# Patient Record
Sex: MALE | Race: WHITE | NOT HISPANIC OR LATINO | Employment: FULL TIME | URBAN - NONMETROPOLITAN AREA
[De-identification: names, ages, dates, MRNs, and addresses within clinical notes are randomized per-mention and may not be internally consistent; named-entity substitution may affect disease eponyms.]

---

## 2018-09-18 ENCOUNTER — TELEPHONE (OUTPATIENT)
Dept: INTERNAL MEDICINE CLINIC | Facility: CLINIC | Age: 53
End: 2018-09-18

## 2018-09-18 NOTE — TELEPHONE ENCOUNTER
Please contact patient and ask to schedule appt if he would like to stay current in our practice so we can stay up to date with his healthcare  It has been over 2 years since we have seen him in the office    Thank you

## 2019-12-23 ENCOUNTER — OFFICE VISIT (OUTPATIENT)
Dept: INTERNAL MEDICINE CLINIC | Facility: CLINIC | Age: 54
End: 2019-12-23
Payer: COMMERCIAL

## 2019-12-23 VITALS
HEIGHT: 75 IN | WEIGHT: 315 LBS | SYSTOLIC BLOOD PRESSURE: 158 MMHG | BODY MASS INDEX: 39.17 KG/M2 | TEMPERATURE: 99 F | DIASTOLIC BLOOD PRESSURE: 80 MMHG | HEART RATE: 85 BPM | OXYGEN SATURATION: 96 %

## 2019-12-23 DIAGNOSIS — Z00.00 ENCOUNTER FOR PREVENTATIVE ADULT HEALTH CARE EXAMINATION: Primary | ICD-10-CM

## 2019-12-23 DIAGNOSIS — Z12.11 COLON CANCER SCREENING: ICD-10-CM

## 2019-12-23 PROCEDURE — 99386 PREV VISIT NEW AGE 40-64: CPT | Performed by: INTERNAL MEDICINE

## 2019-12-23 NOTE — PROGRESS NOTES
BMI Counseling: Body mass index is 43 12 kg/m²  The BMI is above normal  Nutrition recommendations include moderation in carbohydrate intake  Exercise recommendations include exercising 3-5 times per week  No pharmacotherapy was ordered  Assessment/Plan:         Diagnoses and all orders for this visit:    Encounter for preventative adult health care examination  Pt will monitor BP and discussed plans to continue to lose weight  He would prefer not tot take meds for BP but if does not have better control by three month appt or sooner, he will take recommended rx  No added salt, low sodium diet discussed and resumption of exercise - he had gone to the gym in past and felt better   Increase water intake, healthy portions/snacks  He will bring copy of Wellness check that he has scheduled thru work for Thursday    Colon cancer screening  -     Occult Blood, Fecal Immunochemical; Future  Fit test provided and patient agreeable to complete  He will do labs/prostate check next visit (he is not sure if they check thru work program or not so wants to have that first)    Rto 3 months     Patient ID: Belen Baker is a 53 y o  male  HPI   Pt reestablishing care at his wife's encouragement He has lost about 15 pounds in recent weeks and does want to lose more weight No chest pain, sob, headache or vision change He has been told in past his BP was elevated He smith snot monitor He has not been exercising although did in the past and felt better when he did No limiting joint pain No recent illnesses Vision stable and no headaches or dizziness, no facial flushing He does not eat regularly due to is schedule and eats dinner later in the evening daily No change in bowels or bladder function He enjoys working but is close to time when he could retire if he chose    He is scheduled for well check at work Thursday and will have labs he said No heartburn He tends to eat mainly one meal later in the evening and not much during the day He does not add salt to food but does eat fast food at times due to work  He does not get flu shot        Review of Systems   Constitutional: Negative  HENT: Negative  Eyes: Negative  Respiratory: Negative  Cardiovascular: Negative  Gastrointestinal: Negative  Genitourinary: Negative  Musculoskeletal: Negative  Skin: Negative  Neurological: Negative  Hematological: Negative  Psychiatric/Behavioral: Negative  History reviewed  No pertinent past medical history  History reviewed  No pertinent surgical history    Social History     Socioeconomic History    Marital status: /Civil Union     Spouse name: Not on file    Number of children: Not on file    Years of education: Not on file    Highest education level: Not on file   Occupational History    Not on file   Social Needs    Financial resource strain: Not on file    Food insecurity:     Worry: Not on file     Inability: Not on file    Transportation needs:     Medical: Not on file     Non-medical: Not on file   Tobacco Use    Smoking status: Never Smoker    Smokeless tobacco: Never Used   Substance and Sexual Activity    Alcohol use: Not Currently    Drug use: Never    Sexual activity: Not on file   Lifestyle    Physical activity:     Days per week: Not on file     Minutes per session: Not on file    Stress: Not on file   Relationships    Social connections:     Talks on phone: Not on file     Gets together: Not on file     Attends [employer] service: Not on file     Active member of club or organization: Not on file     Attends meetings of clubs or organizations: Not on file     Relationship status: Not on file    Intimate partner violence:     Fear of current or ex partner: Not on file     Emotionally abused: Not on file     Physically abused: Not on file     Forced sexual activity: Not on file   Other Topics Concern    Not on file   Social History Narrative    Not on file     Allergies Allergen Reactions    Penicillins Anaphylaxis           /80   Pulse 85   Temp 99 °F (37 2 °C) (Tympanic)   Ht 6' 3" (1 905 m)   Wt (!) 156 kg (345 lb)   SpO2 96%   BMI 43 12 kg/m²          Physical Exam   Constitutional: He is oriented to person, place, and time  He appears well-developed and well-nourished  No distress  HENT:   Head: Normocephalic and atraumatic  Mouth/Throat: Oropharynx is clear and moist  No oropharyngeal exudate  Eyes: Pupils are equal, round, and reactive to light  Conjunctivae and EOM are normal  No scleral icterus  Neck: Normal range of motion  Neck supple  No JVD present  Pulmonary/Chest: Effort normal and breath sounds normal  No stridor  No respiratory distress  He has no wheezes  Abdominal: Soft  Bowel sounds are normal  He exhibits no distension  There is no tenderness  Musculoskeletal: Normal range of motion  He exhibits no edema  Lymphadenopathy:     He has no cervical adenopathy  Neurological: He is alert and oriented to person, place, and time  Skin: Skin is warm and dry  Capillary refill takes less than 2 seconds  He is not diaphoretic  Psychiatric: He has a normal mood and affect  His behavior is normal  Judgment and thought content normal    Nursing note and vitals reviewed

## 2021-05-11 ENCOUNTER — TELEPHONE (OUTPATIENT)
Dept: INTERNAL MEDICINE CLINIC | Facility: CLINIC | Age: 56
End: 2021-05-11

## 2021-05-11 DIAGNOSIS — Z00.00 ENCOUNTER FOR PREVENTATIVE ADULT HEALTH CARE EXAMINATION: Primary | ICD-10-CM

## 2021-05-17 ENCOUNTER — APPOINTMENT (OUTPATIENT)
Dept: LAB | Facility: MEDICAL CENTER | Age: 56
End: 2021-05-17
Payer: COMMERCIAL

## 2021-05-17 DIAGNOSIS — Z00.00 ENCOUNTER FOR PREVENTATIVE ADULT HEALTH CARE EXAMINATION: ICD-10-CM

## 2021-05-17 DIAGNOSIS — Z12.11 COLON CANCER SCREENING: ICD-10-CM

## 2021-05-17 LAB
25(OH)D3 SERPL-MCNC: 11.4 NG/ML (ref 30–100)
ALBUMIN SERPL BCP-MCNC: 4.1 G/DL (ref 3.5–5)
ALP SERPL-CCNC: 61 U/L (ref 46–116)
ALT SERPL W P-5'-P-CCNC: 35 U/L (ref 12–78)
ANION GAP SERPL CALCULATED.3IONS-SCNC: 4 MMOL/L (ref 4–13)
AST SERPL W P-5'-P-CCNC: 25 U/L (ref 5–45)
BASOPHILS # BLD AUTO: 0.06 THOUSANDS/ΜL (ref 0–0.1)
BASOPHILS NFR BLD AUTO: 1 % (ref 0–1)
BILIRUB SERPL-MCNC: 0.77 MG/DL (ref 0.2–1)
BUN SERPL-MCNC: 14 MG/DL (ref 5–25)
CALCIUM SERPL-MCNC: 9.2 MG/DL (ref 8.3–10.1)
CHLORIDE SERPL-SCNC: 105 MMOL/L (ref 100–108)
CHOLEST SERPL-MCNC: 169 MG/DL (ref 50–200)
CO2 SERPL-SCNC: 28 MMOL/L (ref 21–32)
CREAT SERPL-MCNC: 1.04 MG/DL (ref 0.6–1.3)
EOSINOPHIL # BLD AUTO: 0.22 THOUSAND/ΜL (ref 0–0.61)
EOSINOPHIL NFR BLD AUTO: 2 % (ref 0–6)
ERYTHROCYTE [DISTWIDTH] IN BLOOD BY AUTOMATED COUNT: 14.6 % (ref 11.6–15.1)
GFR SERPL CREATININE-BSD FRML MDRD: 80 ML/MIN/1.73SQ M
GLUCOSE P FAST SERPL-MCNC: 92 MG/DL (ref 65–99)
HCT VFR BLD AUTO: 49 % (ref 36.5–49.3)
HDLC SERPL-MCNC: 27 MG/DL
HGB BLD-MCNC: 16.3 G/DL (ref 12–17)
IMM GRANULOCYTES # BLD AUTO: 0.05 THOUSAND/UL (ref 0–0.2)
IMM GRANULOCYTES NFR BLD AUTO: 1 % (ref 0–2)
LDLC SERPL CALC-MCNC: 89 MG/DL (ref 0–100)
LYMPHOCYTES # BLD AUTO: 1.85 THOUSANDS/ΜL (ref 0.6–4.47)
LYMPHOCYTES NFR BLD AUTO: 20 % (ref 14–44)
MCH RBC QN AUTO: 28.8 PG (ref 26.8–34.3)
MCHC RBC AUTO-ENTMCNC: 33.3 G/DL (ref 31.4–37.4)
MCV RBC AUTO: 87 FL (ref 82–98)
MONOCYTES # BLD AUTO: 0.57 THOUSAND/ΜL (ref 0.17–1.22)
MONOCYTES NFR BLD AUTO: 6 % (ref 4–12)
NEUTROPHILS # BLD AUTO: 6.61 THOUSANDS/ΜL (ref 1.85–7.62)
NEUTS SEG NFR BLD AUTO: 70 % (ref 43–75)
NRBC BLD AUTO-RTO: 0 /100 WBCS
PLATELET # BLD AUTO: 179 THOUSANDS/UL (ref 149–390)
PMV BLD AUTO: 8.9 FL (ref 8.9–12.7)
POTASSIUM SERPL-SCNC: 3.7 MMOL/L (ref 3.5–5.3)
PROT SERPL-MCNC: 8 G/DL (ref 6.4–8.2)
RBC # BLD AUTO: 5.65 MILLION/UL (ref 3.88–5.62)
SODIUM SERPL-SCNC: 137 MMOL/L (ref 136–145)
T4 FREE SERPL-MCNC: 0.76 NG/DL (ref 0.76–1.46)
TRIGL SERPL-MCNC: 263 MG/DL
TSH SERPL DL<=0.05 MIU/L-ACNC: 3.79 UIU/ML (ref 0.36–3.74)
WBC # BLD AUTO: 9.36 THOUSAND/UL (ref 4.31–10.16)

## 2021-05-17 PROCEDURE — 80053 COMPREHEN METABOLIC PANEL: CPT

## 2021-05-17 PROCEDURE — 84153 ASSAY OF PSA TOTAL: CPT

## 2021-05-17 PROCEDURE — 82306 VITAMIN D 25 HYDROXY: CPT

## 2021-05-17 PROCEDURE — 84439 ASSAY OF FREE THYROXINE: CPT

## 2021-05-17 PROCEDURE — 36415 COLL VENOUS BLD VENIPUNCTURE: CPT

## 2021-05-17 PROCEDURE — 84443 ASSAY THYROID STIM HORMONE: CPT

## 2021-05-17 PROCEDURE — 80061 LIPID PANEL: CPT

## 2021-05-17 PROCEDURE — G0328 FECAL BLOOD SCRN IMMUNOASSAY: HCPCS

## 2021-05-17 PROCEDURE — 84154 ASSAY OF PSA FREE: CPT

## 2021-05-17 PROCEDURE — 85025 COMPLETE CBC W/AUTO DIFF WBC: CPT

## 2021-05-19 LAB
PSA FREE MFR SERPL: 14.2 %
PSA FREE SERPL-MCNC: 0.27 NG/ML
PSA SERPL-MCNC: 1.9 NG/ML (ref 0–4)

## 2021-08-21 ENCOUNTER — OFFICE VISIT (OUTPATIENT)
Dept: FAMILY MEDICINE CLINIC | Facility: CLINIC | Age: 56
End: 2021-08-21
Payer: COMMERCIAL

## 2021-08-21 VITALS — BODY MASS INDEX: 39.17 KG/M2 | TEMPERATURE: 97.9 F | WEIGHT: 315 LBS | HEIGHT: 75 IN

## 2021-08-21 DIAGNOSIS — Z00.00 ANNUAL PHYSICAL EXAM: Primary | ICD-10-CM

## 2021-08-21 DIAGNOSIS — Z12.11 COLON CANCER SCREENING: ICD-10-CM

## 2021-08-21 PROCEDURE — 3008F BODY MASS INDEX DOCD: CPT | Performed by: INTERNAL MEDICINE

## 2021-08-21 PROCEDURE — 3725F SCREEN DEPRESSION PERFORMED: CPT | Performed by: INTERNAL MEDICINE

## 2021-08-21 PROCEDURE — 1036F TOBACCO NON-USER: CPT | Performed by: INTERNAL MEDICINE

## 2021-08-21 PROCEDURE — 99396 PREV VISIT EST AGE 40-64: CPT | Performed by: INTERNAL MEDICINE

## 2021-08-21 NOTE — PROGRESS NOTES
[address] [place] [address] PRIMARY CARE    NAME: Albertina Key  AGE: 61 y o  SEX: male  : 1965     DATE: 2021     Assessment and Plan:     Problem List Items Addressed This Visit        Other    Annual physical exam - Primary      Other Visit Diagnoses     Colon cancer screening        Relevant Orders    Occult Blood, Fecal Immunochemical      Reviewed recent labs  Continue weight loss efforts and feels continued weight loss will continue to improve sleep sxs   Increase water intake   Aware 3rd booster for covid   He will schedule eye exam/dental utd  Rto annual/prn    Immunizations and preventive care screenings were discussed with patient today  Appropriate education was printed on patient's after visit summary  Counseling:  · Exercise: the importance of regular exercise/physical activity was discussed  Recommend exercise 3-5 times per week for at least 30 minutes  BMI Counseling: Body mass index is 40 75 kg/m²  The BMI is above normal  Nutrition recommendations include moderation in carbohydrate intake and increasing intake of lean protein  Exercise recommendations include exercising 3-5 times per week  No pharmacotherapy was ordered  Return in about 1 year (around 2022), or if symptoms worsen or fail to improve, for Recheck  Chief Complaint:     Chief Complaint   Patient presents with    Annual Exam      History of Present Illness:     Adult Annual Physical   Patient here for a comprehensive physical exam  The patient reports problems - Working on weight loss down over 40 pounds No chest pain or sob No acute loss   Diet and Physical Activity  · Diet/Nutrition: well balanced diet and low calorie diet  · Exercise: walking        Depression Screening  PHQ-9 Depression Screening    PHQ-9:   Frequency of the following problems over the past two weeks:      Little interest or pleasure in doing things: 0 - not at all  Feeling down, depressed, or hopeless: 0 - not at all  PHQ-2 Score: 0       General Health  · Sleep: gets 7-8 hours of sleep on average  · Hearing: normal - bilateral   · Vision: goes for regular eye exams  · Dental: regular dental visits   Health  · Symptoms include: none     Review of Systems:     Review of Systems   Constitutional: Negative  Negative for activity change, chills and fever  HENT: Negative  Eyes: Negative for visual disturbance  Respiratory: Negative for cough and shortness of breath  Cardiovascular: Negative for chest pain, palpitations and leg swelling  Gastrointestinal: Negative for abdominal distention and abdominal pain  Genitourinary: Negative for difficulty urinating, flank pain and frequency  Musculoskeletal: Negative  Neurological: Negative for dizziness, light-headedness and headaches  Psychiatric/Behavioral: Negative for sleep disturbance  The patient is not nervous/anxious  Past Medical History:     History reviewed  No pertinent past medical history  Past Surgical History:     History reviewed  No pertinent surgical history  Family History:     History reviewed  No pertinent family history     Social History:     Social History     Socioeconomic History    Marital status: Single     Spouse name: None    Number of children: None    Years of education: None    Highest education level: None   Occupational History    None   Tobacco Use    Smoking status: Never Smoker    Smokeless tobacco: Never Used   Vaping Use    Vaping Use: Never used   Substance and Sexual Activity    Alcohol use: Not Currently    Drug use: Never    Sexual activity: None   Other Topics Concern    None   Social History Narrative    None     Social Determinants of Health     Financial Resource Strain:     Difficulty of Paying Living Expenses:    Food Insecurity:     Worried About Running Out of Food in the Last Year:     Lloyd of Food in the Last Year:    Transportation Needs:  Lack of Transportation (Medical):  Lack of Transportation (Non-Medical):    Physical Activity:     Days of Exercise per Week:     Minutes of Exercise per Session:    Stress:     Feeling of Stress :    Social Connections:     Frequency of Communication with Friends and Family:     Frequency of Social Gatherings with Friends and Family:     Attends [employer] Services:     Active Member of Clubs or Organizations:     Attends Club or Organization Meetings:     Marital Status:    Intimate Partner Violence:     Fear of Current or Ex-Partner:     Emotionally Abused:     Physically Abused:     Sexually Abused:       Current Medications:     No current outpatient medications on file  No current facility-administered medications for this visit  Allergies:      Allergies   Allergen Reactions    Penicillins Anaphylaxis      Physical Exam:     Temp 97 9 °F (36 6 °C) (Temporal)   Ht 6' 3" (1 905 m)   Wt (!) 148 kg (326 lb)   BMI 40 75 kg/m²     Physical Exam     Dede Garcia DO  [facility]

## 2021-08-21 NOTE — PATIENT INSTRUCTIONS

## 2021-08-23 LAB — HEMOCCULT STL QL IA: NEGATIVE

## 2022-03-23 ENCOUNTER — OFFICE VISIT (OUTPATIENT)
Dept: FAMILY MEDICINE CLINIC | Facility: CLINIC | Age: 57
End: 2022-03-23
Payer: COMMERCIAL

## 2022-03-23 VITALS
HEART RATE: 78 BPM | TEMPERATURE: 97.7 F | WEIGHT: 315 LBS | SYSTOLIC BLOOD PRESSURE: 138 MMHG | BODY MASS INDEX: 39.17 KG/M2 | HEIGHT: 75 IN | RESPIRATION RATE: 18 BRPM | DIASTOLIC BLOOD PRESSURE: 76 MMHG

## 2022-03-23 DIAGNOSIS — S80.12XS TRAUMATIC HEMATOMA OF LEFT LOWER LEG, SEQUELA: Primary | ICD-10-CM

## 2022-03-23 PROBLEM — S80.12XA TRAUMATIC HEMATOMA OF LEFT LOWER LEG: Status: ACTIVE | Noted: 2022-03-23

## 2022-03-23 PROCEDURE — 3008F BODY MASS INDEX DOCD: CPT | Performed by: INTERNAL MEDICINE

## 2022-03-23 PROCEDURE — 1036F TOBACCO NON-USER: CPT | Performed by: INTERNAL MEDICINE

## 2022-03-23 PROCEDURE — 99213 OFFICE O/P EST LOW 20 MIN: CPT | Performed by: INTERNAL MEDICINE

## 2022-03-23 NOTE — PROGRESS NOTES
BMI Counseling: Body mass index is 42 5 kg/m²  The BMI is above normal  Nutrition recommendations include consuming healthier snacks, moderation in carbohydrate intake and increasing intake of lean protein  Exercise recommendations include exercising 3-5 times per week  Rationale for BMI follow-up plan is due to patient being overweight or obese  Assessment/Plan:      Diagnoses and all orders for this visit:    Traumatic hematoma of left lower leg, sequela  -     VAS lower limb venous duplex study, unilateral/limited; Future  Doppler to be schedule  Take FS aspirin daily Elevate at rest May need vascular followup as has significant varicosities underlying           Annual due in August     Patient ID: Ming Stevens is a 61 y o  male  HPI   Pt injured his leg 2 months ago - scraped on [employer] object  Open area closed but has discoloration and swelling lle no pain He has been working has varicose veins, nontender     Review of Systems   Constitutional: Negative for activity change, chills and fever  Respiratory: Negative for shortness of breath  Cardiovascular: Positive for leg swelling  Negative for chest pain  Musculoskeletal: Positive for arthralgias  Neurological: Negative for dizziness, light-headedness and numbness  No past medical history on file  No past surgical history on file    Social History     Socioeconomic History    Marital status: /Civil Union     Spouse name: Not on file    Number of children: Not on file    Years of education: Not on file    Highest education level: Not on file   Occupational History    Not on file   Tobacco Use    Smoking status: Never Smoker    Smokeless tobacco: Never Used   Vaping Use    Vaping Use: Never used   Substance and Sexual Activity    Alcohol use: Not Currently    Drug use: Never    Sexual activity: Not on file   Other Topics Concern    Not on file   Social History Narrative    Not on file     Social Determinants of Health Financial Resource Strain: Not on file   Food Insecurity: Not on file   Transportation Needs: Not on file   Physical Activity: Not on file   Stress: Not on file   Social Connections: Not on file   Intimate Partner Violence: Not on file   Housing Stability: Not on file     Allergies   Allergen Reactions    Penicillins Anaphylaxis     Visit Vitals  /76   Pulse 78   Temp 97 7 °F (36 5 °C) (Temporal)   Resp 18   Ht 6' 3" (1 905 m)   Wt (!) 154 kg (340 lb)   BMI 42 50 kg/m²   Smoking Status Never Smoker   BSA 2 75 m²          Physical Exam  Constitutional:       General: He is not in acute distress  Appearance: Normal appearance  He is not ill-appearing, toxic-appearing or diaphoretic  HENT:      Head: Normocephalic and atraumatic  Nose: Nose normal    Eyes:      General: No scleral icterus  Cardiovascular:      Pulses: Normal pulses  Heart sounds: Normal heart sounds  No murmur heard  Pulmonary:      Effort: Pulmonary effort is normal  No respiratory distress  Breath sounds: Normal breath sounds  No wheezing  Abdominal:      General: Bowel sounds are normal       Palpations: Abdomen is soft  Musculoskeletal:         General: Swelling and signs of injury present  No tenderness  Comments: Hematoma nt above left leg   Skin:     General: Skin is dry  Coloration: Skin is not jaundiced or pale  Findings: No erythema  Neurological:      General: No focal deficit present  Mental Status: He is alert and oriented to person, place, and time  Mental status is at baseline  Psychiatric:         Mood and Affect: Mood normal          Behavior: Behavior normal          Thought Content:  Thought content normal          Judgment: Judgment normal        slight pink color to shin area no warmth

## 2022-03-28 ENCOUNTER — HOSPITAL ENCOUNTER (OUTPATIENT)
Dept: NON INVASIVE DIAGNOSTICS | Facility: HOSPITAL | Age: 57
Discharge: HOME | End: 2022-03-28
Payer: COMMERCIAL

## 2022-03-28 DIAGNOSIS — S80.12XS TRAUMATIC HEMATOMA OF LEFT LOWER LEG, SEQUELA: ICD-10-CM

## 2022-03-28 PROCEDURE — 93971 EXTREMITY STUDY: CPT

## 2022-03-28 PROCEDURE — 93971 EXTREMITY STUDY: CPT | Performed by: SURGERY

## 2022-10-10 ENCOUNTER — OFFICE VISIT (OUTPATIENT)
Dept: FAMILY MEDICINE CLINIC | Facility: CLINIC | Age: 57
End: 2022-10-10
Payer: COMMERCIAL

## 2022-10-10 VITALS
DIASTOLIC BLOOD PRESSURE: 80 MMHG | HEART RATE: 70 BPM | RESPIRATION RATE: 18 BRPM | TEMPERATURE: 98 F | SYSTOLIC BLOOD PRESSURE: 142 MMHG | BODY MASS INDEX: 39.17 KG/M2 | HEIGHT: 75 IN | WEIGHT: 315 LBS

## 2022-10-10 DIAGNOSIS — R05.2 SUBACUTE COUGH: ICD-10-CM

## 2022-10-10 DIAGNOSIS — E66.01 MORBID OBESITY (HCC): ICD-10-CM

## 2022-10-10 DIAGNOSIS — E03.9 ACQUIRED HYPOTHYROIDISM: ICD-10-CM

## 2022-10-10 DIAGNOSIS — Z00.00 ANNUAL PHYSICAL EXAM: Primary | ICD-10-CM

## 2022-10-10 DIAGNOSIS — Z12.11 SCREENING FOR COLON CANCER: ICD-10-CM

## 2022-10-10 PROBLEM — S80.12XA TRAUMATIC HEMATOMA OF LEFT LOWER LEG: Status: RESOLVED | Noted: 2022-03-23 | Resolved: 2022-10-10

## 2022-10-10 PROCEDURE — 99396 PREV VISIT EST AGE 40-64: CPT | Performed by: INTERNAL MEDICINE

## 2022-10-10 NOTE — PROGRESS NOTES
[address]    NAME: Nathanael Bhatt  AGE: 61 y o  SEX: male  : 1965     DATE: 10/10/2022     Assessment and Plan:     Problem List Items Addressed This Visit        Other    Morbid obesity ([address] )    Relevant Orders    TSH, 3rd generation with Free T4 reflex    Annual physical exam - Primary    Relevant Orders    Lipid panel    Comprehensive metabolic panel      Other Visit Diagnoses     Acquired hypothyroidism        Relevant Orders    TSH, 3rd generation with Free T4 reflex    Subacute cough        Relevant Orders    XR chest pa & lateral    Screening for colon cancer        Relevant Orders    Cologuard      Rx for cologard  CXR and labs - pt will go Saturday   Weight loss and improved diet habits encouraged   No added salt diet Increase water intake  Exercise encouraged  Deferred flu shot   Annual/prn pending labs and cxr may need pulmonary/cardiac workup Pt states he will work on weight loss which has been able to accomplish in past which will help overall status     Immunizations and preventive care screenings were discussed with patient today  Appropriate education was printed on patient's after visit summary  Discussed risks and benefits of prostate cancer screening  We discussed the controversial history of PSA screening for prostate cancer in the [place] as well as the risk of over detection and over treatment of prostate cancer by way of PSA screening  The patient understands that PSA blood testing is an imperfect way to screen for prostate cancer and that elevated PSA levels in the blood may also be caused by infection, inflammation, prostatic trauma or manipulation, urological procedures, or by benign prostatic enlargement      The role of the digital rectal examination in prostate cancer screening was also discussed and I discussed with him that there is large interobserver variability in the findings of digital rectal examination  Counseling:  · Exercise: the importance of regular exercise/physical activity was discussed  Recommend exercise 3-5 times per week for at least 30 minutes  Depression Screening and Follow-up Plan: Patient was screened for depression during today's encounter  They screened negative with a PHQ-2 score of 0  Return in about 1 year (around 10/10/2023), or if symptoms worsen or fail to improve  Chief Complaint:     Chief Complaint   Patient presents with   • Annual Exam   • Cough     Lingering cough for weeks, negative home covid test        History of Present Illness:     Adult Annual Physical   Patient here for a comprehensive physical exam  The patient reports problems - Cough lingering since uri about 4 weeks ago COvid negative Mostly dry cough Occasional mucous Was taking robitussin but nothing daily Has nasal congestion  Diet and Physical Activity  · Diet/Nutrition: limited junk food  · Exercise: no formal exercise  Depression Screening  PHQ-2/9 Depression Screening    Little interest or pleasure in doing things: 0 - not at all  Feeling down, depressed, or hopeless: 0 - not at all  PHQ-2 Score: 0  PHQ-2 Interpretation: Negative depression screen       General Health  · Sleep: gets 4-6 hours of sleep on average  · Hearing: normal - bilateral   · Vision: no vision problems  · Dental: regular dental visits   Health  · Symptoms include: none     Review of Systems:     Review of Systems   Constitutional: Negative for activity change, chills and fever  HENT: Positive for congestion and postnasal drip  Eyes: Negative for visual disturbance  Respiratory: Positive for shortness of breath  Negative for cough  Cardiovascular: Negative for chest pain, palpitations and leg swelling  Gastrointestinal: Negative for abdominal distention and abdominal pain  Genitourinary: Negative  Neurological: Negative for dizziness     Psychiatric/Behavioral: Negative for sleep disturbance  The patient is not nervous/anxious  Past Medical History:     Past Medical History:   Diagnosis Date   • Hypertension 8/7/2012      Past Surgical History:     History reviewed  No pertinent surgical history  Family History:     History reviewed  No pertinent family history  Social History:     Social History     Socioeconomic History   • Marital status: /Civil Union     Spouse name: None   • Number of children: None   • Years of education: None   • Highest education level: None   Occupational History   • None   Tobacco Use   • Smoking status: Never Smoker   • Smokeless tobacco: Never Used   Vaping Use   • Vaping Use: Never used   Substance and Sexual Activity   • Alcohol use: Not Currently   • Drug use: Never   • Sexual activity: None   Other Topics Concern   • None   Social History Narrative   • None     Social Determinants of Health     Financial Resource Strain: Not on file   Food Insecurity: Not on file   Transportation Needs: Not on file   Physical Activity: Not on file   Stress: Not on file   Social Connections: Not on file   Intimate Partner Violence: Not on file   Housing Stability: Not on file      Current Medications:     No current outpatient medications on file  No current facility-administered medications for this visit  Allergies: Allergies   Allergen Reactions   • Penicillins Anaphylaxis      Physical Exam:     /76   Pulse 70   Temp 98 °F (36 7 °C) (Temporal)   Resp 18   Ht 6' 3" (1 905 m)   Wt (!) 166 kg (367 lb)   BMI 45 87 kg/m²     Physical Exam  Vitals reviewed  Constitutional:       General: He is not in acute distress  Appearance: Normal appearance  He is not ill-appearing, toxic-appearing or diaphoretic  HENT:      Head: Normocephalic and atraumatic  Right Ear: External ear normal       Left Ear: External ear normal       Nose: Nose normal       Mouth/Throat:      Mouth: Mucous membranes are dry        Pharynx: Oropharyngeal exudate present  Eyes:      General: No scleral icterus  Extraocular Movements: Extraocular movements intact  Conjunctiva/sclera: Conjunctivae normal       Pupils: Pupils are equal, round, and reactive to light  Cardiovascular:      Rate and Rhythm: Normal rate and regular rhythm  Pulses: Normal pulses  Heart sounds: Normal heart sounds  Pulmonary:      Effort: Pulmonary effort is normal  No respiratory distress  Breath sounds: Normal breath sounds  No wheezing  Abdominal:      General: Bowel sounds are normal  There is no distension  Palpations: Abdomen is soft  Tenderness: There is no abdominal tenderness  Musculoskeletal:      Cervical back: Normal range of motion and neck supple  No rigidity  Right lower leg: No edema  Left lower leg: No edema  Lymphadenopathy:      Cervical: No cervical adenopathy  Skin:     General: Skin is dry  Coloration: Skin is not jaundiced or pale  Neurological:      General: No focal deficit present  Mental Status: He is alert and oriented to person, place, and time  Mental status is at baseline  Psychiatric:         Mood and Affect: Mood normal          Behavior: Behavior normal          Thought Content:  Thought content normal          Judgment: Judgment normal           Hyacinth Garrett DO  [facility]

## 2022-10-10 NOTE — PATIENT INSTRUCTIONS

## 2022-10-15 ENCOUNTER — LAB (OUTPATIENT)
Dept: LAB | Facility: MEDICAL CENTER | Age: 57
End: 2022-10-15
Payer: COMMERCIAL

## 2022-10-15 ENCOUNTER — APPOINTMENT (OUTPATIENT)
Dept: RADIOLOGY | Facility: MEDICAL CENTER | Age: 57
End: 2022-10-15
Payer: COMMERCIAL

## 2022-10-15 DIAGNOSIS — R05.2 SUBACUTE COUGH: ICD-10-CM

## 2022-10-15 DIAGNOSIS — E03.9 ACQUIRED HYPOTHYROIDISM: ICD-10-CM

## 2022-10-15 DIAGNOSIS — E66.01 MORBID OBESITY (HCC): ICD-10-CM

## 2022-10-15 DIAGNOSIS — Z00.00 ANNUAL PHYSICAL EXAM: ICD-10-CM

## 2022-10-15 PROCEDURE — 80053 COMPREHEN METABOLIC PANEL: CPT

## 2022-10-15 PROCEDURE — 84443 ASSAY THYROID STIM HORMONE: CPT

## 2022-10-15 PROCEDURE — 36415 COLL VENOUS BLD VENIPUNCTURE: CPT

## 2022-10-15 PROCEDURE — 71046 X-RAY EXAM CHEST 2 VIEWS: CPT

## 2022-10-15 PROCEDURE — 80061 LIPID PANEL: CPT

## 2022-10-16 DIAGNOSIS — R73.9 HYPERGLYCEMIA: Primary | ICD-10-CM

## 2022-10-16 LAB
ALBUMIN SERPL BCP-MCNC: 3.5 G/DL (ref 3.5–5)
ALP SERPL-CCNC: 61 U/L (ref 46–116)
ALT SERPL W P-5'-P-CCNC: 41 U/L (ref 12–78)
ANION GAP SERPL CALCULATED.3IONS-SCNC: 5 MMOL/L (ref 4–13)
AST SERPL W P-5'-P-CCNC: 23 U/L (ref 5–45)
BILIRUB SERPL-MCNC: 0.58 MG/DL (ref 0.2–1)
BUN SERPL-MCNC: 19 MG/DL (ref 5–25)
CALCIUM SERPL-MCNC: 9 MG/DL (ref 8.3–10.1)
CHLORIDE SERPL-SCNC: 107 MMOL/L (ref 96–108)
CHOLEST SERPL-MCNC: 146 MG/DL
CO2 SERPL-SCNC: 26 MMOL/L (ref 21–32)
CREAT SERPL-MCNC: 1.31 MG/DL (ref 0.6–1.3)
GFR SERPL CREATININE-BSD FRML MDRD: 59 ML/MIN/1.73SQ M
GLUCOSE P FAST SERPL-MCNC: 119 MG/DL (ref 65–99)
HDLC SERPL-MCNC: 22 MG/DL
LDLC SERPL CALC-MCNC: 88 MG/DL (ref 0–100)
NONHDLC SERPL-MCNC: 124 MG/DL
POTASSIUM SERPL-SCNC: 4.2 MMOL/L (ref 3.5–5.3)
PROT SERPL-MCNC: 7.8 G/DL (ref 6.4–8.4)
SODIUM SERPL-SCNC: 138 MMOL/L (ref 135–147)
TRIGL SERPL-MCNC: 182 MG/DL
TSH SERPL DL<=0.05 MIU/L-ACNC: 3.87 UIU/ML (ref 0.45–4.5)

## 2023-10-31 ENCOUNTER — OFFICE VISIT (OUTPATIENT)
Dept: FAMILY MEDICINE CLINIC | Facility: CLINIC | Age: 58
End: 2023-10-31
Payer: COMMERCIAL

## 2023-10-31 VITALS
HEART RATE: 85 BPM | BODY MASS INDEX: 39.17 KG/M2 | TEMPERATURE: 97.8 F | RESPIRATION RATE: 18 BRPM | HEIGHT: 75 IN | OXYGEN SATURATION: 96 % | DIASTOLIC BLOOD PRESSURE: 72 MMHG | SYSTOLIC BLOOD PRESSURE: 134 MMHG | WEIGHT: 315 LBS

## 2023-10-31 DIAGNOSIS — E03.9 HYPOTHYROIDISM, UNSPECIFIED TYPE: ICD-10-CM

## 2023-10-31 DIAGNOSIS — Z00.00 ANNUAL PHYSICAL EXAM: Primary | ICD-10-CM

## 2023-10-31 DIAGNOSIS — E78.2 MIXED HYPERLIPIDEMIA: ICD-10-CM

## 2023-10-31 PROCEDURE — 99396 PREV VISIT EST AGE 40-64: CPT | Performed by: INTERNAL MEDICINE

## 2023-10-31 NOTE — PROGRESS NOTES
[facility]    NAME: Margo Adame  AGE: 60 y.o. SEX: male  : 1965     DATE: 10/31/2023     Assessment and Plan:     Problem List Items Addressed This Visit          Other    Annual physical exam - Primary     Other Visit Diagnoses       Hypothyroidism, unspecified type        Mixed hyperlipidemia            Low fat diet Exercise encouraged  Pt will get wellness labs at work soon and will bring copy  Stay hydrated   Deferred flu shot     Rto 6months     Immunizations and preventive care screenings were discussed with patient today. Appropriate education was printed on patient's after visit summary. Counseling:  Exercise: the importance of regular exercise/physical activity was discussed. Recommend exercise 3-5 times per week for at least 30 minutes. BMI Counseling: Body mass index is 45.12 kg/m². The BMI is above normal. Nutrition recommendations include consuming healthier snacks and increasing intake of lean protein. Exercise recommendations include exercising 3-5 times per week. Rationale for BMI follow-up plan is due to patient being overweight or obese. Depression Screening and Follow-up Plan: Patient was screened for depression during today's encounter. They screened negative with a PHQ-2 score of 0. Return in about 6 months (around 2024). Chief Complaint:     Chief Complaint   Patient presents with    Annual Exam      History of Present Illness:     Adult Annual Physical   Patient here for a comprehensive physical exam. The patient reports problems - Annual PE Patient doing ok Has new position and works in office now He is adapting No regular exercise and diet he feels is better than it was Recovering from a cold otherwise no acute issues  . Diet and Physical Activity  Diet/Nutrition: well balanced diet. Exercise: no formal exercise.       Depression Screening  PHQ-2/9 Depression Screening Little interest or pleasure in doing things: 0 - not at all  Feeling down, depressed, or hopeless: 0 - not at all  PHQ-2 Score: 0  PHQ-2 Interpretation: Negative depression screen       General Health  Sleep: gets 7-8 hours of sleep on average. Hearing: normal - bilateral.  Vision: no vision problems and goes for regular eye exams. Dental: regular dental visits.  Health  Symptoms include: none    Advanced Care Planning  Do you have an advanced directive? no  Do you have a durable medical power of ? no     Review of Systems:     Review of Systems   Constitutional:  Negative for chills and fever. HENT:  Positive for congestion. Clearing uri   Eyes:  Negative for visual disturbance. Respiratory:  Negative for cough and shortness of breath. Cardiovascular:  Negative for chest pain, palpitations and leg swelling. Gastrointestinal:  Negative for abdominal distention and abdominal pain. Genitourinary: Negative. Musculoskeletal:  Positive for arthralgias. Neurological:  Negative for dizziness, light-headedness and headaches. Psychiatric/Behavioral:  Negative for sleep disturbance. The patient is not nervous/anxious. Past Medical History:     Past Medical History:   Diagnosis Date    Hypertension 8/7/2012      Past Surgical History:     History reviewed. No pertinent surgical history. Family History:     History reviewed. No pertinent family history.    Social History:     Social History     Socioeconomic History    Marital status: /Civil Union     Spouse name: None    Number of children: None    Years of education: None    Highest education level: None   Occupational History    None   Tobacco Use    Smoking status: Never    Smokeless tobacco: Never   Vaping Use    Vaping Use: Never used   Substance and Sexual Activity    Alcohol use: Not Currently    Drug use: Never    Sexual activity: None   Other Topics Concern    None   Social History Narrative    None Social Determinants of Health     Financial Resource Strain: Not on file   Food Insecurity: Not on file   Transportation Needs: Not on file   Physical Activity: Not on file   Stress: Not on file   Social Connections: Not on file   Intimate Partner Violence: Not on file   Housing Stability: Not on file      Current Medications:     No current outpatient medications on file. No current facility-administered medications for this visit. Allergies: Allergies   Allergen Reactions    Penicillins Anaphylaxis      Physical Exam:     /72   Pulse 85   Temp 97.8 °F (36.6 °C) (Temporal)   Resp 18   Ht 6' 3" (1.905 m)   Wt (!) 164 kg (361 lb)   SpO2 96%   BMI 45.12 kg/m²     Physical Exam  Vitals reviewed. Constitutional:       General: He is not in acute distress. Appearance: Normal appearance. He is not ill-appearing, toxic-appearing or diaphoretic. HENT:      Head: Normocephalic and atraumatic. Right Ear: External ear normal.      Left Ear: External ear normal.      Nose: Congestion present. Mouth/Throat:      Mouth: Mucous membranes are moist.   Eyes:      Extraocular Movements: Extraocular movements intact. Conjunctiva/sclera: Conjunctivae normal.      Pupils: Pupils are equal, round, and reactive to light. Cardiovascular:      Rate and Rhythm: Normal rate and regular rhythm. Pulses: Normal pulses. Pulmonary:      Effort: Pulmonary effort is normal. No respiratory distress. Breath sounds: Normal breath sounds. No wheezing. Abdominal:      General: Bowel sounds are normal. There is no distension. Palpations: Abdomen is soft. Tenderness: There is no abdominal tenderness. Musculoskeletal:      Cervical back: Normal range of motion and neck supple. Right lower leg: No edema. Left lower leg: No edema. Lymphadenopathy:      Cervical: No cervical adenopathy. Skin:     General: Skin is warm and dry.    Neurological:      General: No focal deficit present. Mental Status: He is alert and oriented to person, place, and time. Mental status is at baseline. Cranial Nerves: No cranial nerve deficit. Psychiatric:         Mood and Affect: Mood normal.         Behavior: Behavior normal.         Thought Content:  Thought content normal.         Judgment: Judgment normal.          DO Lc Shetty

## 2023-10-31 NOTE — PATIENT INSTRUCTIONS
Wellness Visit for Adults   AMBULATORY CARE:   A wellness visit  is when you see your healthcare provider to get screened for health problems. Your healthcare provider will also give you advice on how to stay healthy. Write down your questions so you remember to ask them. Ask your healthcare provider how often you should have a wellness visit. What happens at a wellness visit:  Your healthcare provider will ask about your health, and your family history of health problems. This includes high blood pressure, heart disease, and cancer. He or she will ask if you have symptoms that concern you, if you smoke, and about your mood. You may also be asked about your intake of medicines, supplements, food, and alcohol. Any of the following may be done: Your weight  will be checked. Your height may also be checked so your body mass index (BMI) can be calculated. Your BMI shows if you are at a healthy weight. Your blood pressure  and heart rate will be checked. Your temperature may also be checked. Blood and urine tests  may be done. Blood tests may be done to check your cholesterol levels. Abnormal cholesterol levels increase your risk for heart disease and stroke. You may also need a blood or urine test to check for diabetes if you are at increased risk. Urine tests may be done to look for signs of an infection or kidney disease. A physical exam  includes checking your heartbeat and lungs with a stethoscope. Your healthcare provider may also check your skin to look for sun damage. Screening tests  may be recommended. A screening test is done to check for diseases that may not cause symptoms. The screening tests you may need depend on your age, gender, family history, and lifestyle habits. For example, colorectal screening may be recommended if you are 48years old or older. Screening tests you need if you are a woman:   A Pap smear  is used to screen for cervical cancer.  Pap smears are usually done every 3 to 5 years depending on your age. You may need them more often if you have had abnormal Pap smear test results in the past. Ask your healthcare provider how often you should have a Pap smear. A mammogram  is an x-ray of your breasts to screen for breast cancer. Experts recommend mammograms every 2 years starting at age 48 years. You may need a mammogram at age 52 years or younger if you have an increased risk for breast cancer. Talk to your healthcare provider about when you should start having mammograms and how often you need them. Vaccines you may need:   Get an influenza vaccine  every year. The influenza vaccine protects you from the flu. Several types of viruses cause the flu. The viruses change over time, so new vaccines are made each year. Get a tetanus-diphtheria (Td) booster vaccine  every 10 years. This vaccine protects you against tetanus and diphtheria. Tetanus is a severe infection that may cause painful muscle spasms and lockjaw. Diphtheria is a severe bacterial infection that causes a thick covering in the back of your mouth and throat. Get a human papillomavirus (HPV) vaccine  if you are female and aged 23 to 32 or male 23 to 24 and never received it. This vaccine protects you from HPV infection. HPV is the most common infection spread by sexual contact. HPV may also cause vaginal, penile, and anal cancers. Get a pneumococcal vaccine  if you are aged 72 years or older. The pneumococcal vaccine is an injection given to protect you from pneumococcal disease. Pneumococcal disease is an infection caused by pneumococcal bacteria. The infection may cause pneumonia, meningitis, or an ear infection. Get a shingles vaccine  if you are 60 or older, even if you have had shingles before. The shingles vaccine is an injection to protect you from the varicella-zoster virus. This is the same virus that causes chickenpox.  Shingles is a painful rash that develops in people who had chickenpox or have been exposed to the virus. How to eat healthy:  My Plate is a model for planning healthy meals. It shows the types and amounts of foods that should go on your plate. Fruits and vegetables make up about half of your plate, and grains and protein make up the other half. A serving of dairy is included on the side of your plate. The amount of calories and serving sizes you need depends on your age, gender, weight, and height. Examples of healthy foods are listed below:  Eat a variety of vegetables  such as dark green, red, and orange vegetables. You can also include canned vegetables low in sodium (salt) and frozen vegetables without added butter or sauces. Eat a variety of fresh fruits , canned fruit in 100% juice, frozen fruit, and dried fruit. Include whole grains. At least half of the grains you eat should be whole grains. Examples include whole-wheat bread, wheat pasta, brown rice, and whole-grain cereals such as oatmeal.    Eat a variety of protein foods such as seafood (fish and shellfish), lean meat, and poultry without skin (turkey and chicken). Examples of lean meats include pork leg, shoulder, or tenderloin, and beef round, sirloin, tenderloin, and extra lean ground beef. Other protein foods include eggs and egg substitutes, beans, peas, soy products, nuts, and seeds. Choose low-fat dairy products such as skim or 1% milk or low-fat yogurt, cheese, and cottage cheese. Limit unhealthy fats  such as butter, hard margarine, and shortening. Exercise:  Exercise at least 30 minutes per day on most days of the week. Some examples of exercise include walking, biking, dancing, and swimming. You can also fit in more physical activity by taking the stairs instead of the elevator or parking farther away from stores. Include muscle strengthening activities 2 days each week. Regular exercise provides many health benefits.  It helps you manage your weight, and decreases your risk for type 2 diabetes, heart disease, stroke, and high blood pressure. Exercise can also help improve your mood. Ask your healthcare provider about the best exercise plan for you. General health and safety guidelines:   Do not smoke. Nicotine and other chemicals in cigarettes and cigars can cause lung damage. Ask your healthcare provider for information if you currently smoke and need help to quit. E-cigarettes or smokeless tobacco still contain nicotine. Talk to your healthcare provider before you use these products. Limit alcohol. A drink of alcohol is 12 ounces of beer, 5 ounces of wine, or 1½ ounces of liquor. Lose weight, if needed. Being overweight increases your risk of certain health conditions. These include heart disease, high blood pressure, type 2 diabetes, and certain types of cancer. Protect your skin. Do not sunbathe or use tanning beds. Use sunscreen with a SPF 15 or higher. Apply sunscreen at least 15 minutes before you go outside. Reapply sunscreen every 2 hours. Wear protective clothing, hats, and sunglasses when you are outside. Drive safely. Always wear your seatbelt. Make sure everyone in your car wears a seatbelt. A seatbelt can save your life if you are in an accident. Do not use your cell phone when you are driving. This could distract you and cause an accident. Pull over if you need to make a call or send a text message. Practice safe sex. Use latex condoms if are sexually active and have more than one partner. Your healthcare provider may recommend screening tests for sexually transmitted infections (STIs). Wear helmets, lifejackets, and protective gear. Always wear a helmet when you ride a bike or motorcycle, go skiing, or play sports that could cause a head injury. Wear protective equipment when you play sports. Wear a lifejacket when you are on a boat or doing water sports.     © Copyright Sabrina Subramanian 2023 Information is for End User's use only and may not be sold, redistributed or otherwise used for commercial purposes. The above information is an  only. It is not intended as medical advice for individual conditions or treatments. Talk to your doctor, nurse or pharmacist before following any medical regimen to see if it is safe and effective for you.

## 2023-11-18 DIAGNOSIS — Z12.11 SCREEN FOR COLON CANCER: Primary | ICD-10-CM

## 2023-12-08 ENCOUNTER — TELEPHONE (OUTPATIENT)
Age: 58
End: 2023-12-08

## 2023-12-08 NOTE — TELEPHONE ENCOUNTER
12/08/23  Screened by: Temi Melo    Referring Provider     Pre- Screening: There is no height or weight on file to calculate BMI. Has patient been referred for a routine screening Colonoscopy? yes  Is the patient between 43-73 years old? yes      Previous Colonoscopy no   If yes:    Date:     Facility:     Reason:       SCHEDULING STAFF: If the patient is between 45yrs-49yrs, please advise patient to confirm benefits/coverage with their insurance company for a routine screening colonoscopy, some insurance carriers will only cover at [address] [place] [address] or older. If the patient is over 66years old, please schedule an office visit. Does the patient want to see a Gastroenterologist prior to their procedure OR are they having any GI symptoms? no    Has the patient been hospitalized or had abdominal surgery in the past 6 months? no    Does the patient use supplemental oxygen? no    Does the patient take Coumadin, Lovenox, Plavix, Elliquis, Xarelto, or other blood thinning medication? no    Has the patient had a stroke, cardiac event, or stent placed in the past year? no    SCHEDULING STAFF: If patient answers NO to above questions, then schedule procedure. If patient answers YES to above questions, then schedule office appointment. PASSED OA    If patient is between 45yrs - 49yrs, please advise patient that we will have to confirm benefits & coverage with their insurance company for a routine screening colonoscopy.

## 2023-12-08 NOTE — TELEPHONE ENCOUNTER
Scheduled date of colonoscopy (as of today): 2/28/24  Physician performing colonoscopy: naldo  Location of colonoscopy: [facility]  Bowel prep reviewed with patient: nick / bib  Clearances: n/a

## 2023-12-22 LAB — HBA1C MFR BLD HPLC: 6.4 %

## 2024-05-01 ENCOUNTER — OFFICE VISIT (OUTPATIENT)
Dept: FAMILY MEDICINE CLINIC | Facility: CLINIC | Age: 59
End: 2024-05-01
Payer: COMMERCIAL

## 2024-05-01 VITALS
DIASTOLIC BLOOD PRESSURE: 74 MMHG | HEIGHT: 75 IN | OXYGEN SATURATION: 97 % | BODY MASS INDEX: 39.17 KG/M2 | RESPIRATION RATE: 18 BRPM | WEIGHT: 315 LBS | HEART RATE: 88 BPM | SYSTOLIC BLOOD PRESSURE: 138 MMHG | TEMPERATURE: 97.4 F

## 2024-05-01 DIAGNOSIS — E66.01 MORBID OBESITY (HCC): ICD-10-CM

## 2024-05-01 DIAGNOSIS — R73.03 PREDIABETES: Primary | ICD-10-CM

## 2024-05-01 DIAGNOSIS — Z12.11 COLON CANCER SCREENING: ICD-10-CM

## 2024-05-01 PROCEDURE — 99214 OFFICE O/P EST MOD 30 MIN: CPT | Performed by: INTERNAL MEDICINE

## 2024-05-01 PROCEDURE — 3725F SCREEN DEPRESSION PERFORMED: CPT | Performed by: INTERNAL MEDICINE

## 2024-05-01 NOTE — PROGRESS NOTES
"Name: Tyson Tovar      : 1965      MRN: [id number]  Encounter Provider: Estrellita Bryant DO  Encounter Date: 2024   Encounter department: [facility]    Assessment & Plan     1. Prediabetes  A1c 6.4 and discussed lo carb diet and increased water intake He has stopped soda and plans to work on diet     2. Morbid obesity (HCC)  Pt aware and will work on weight loss with diet changes and walking    3. Colon cancer screening  He has to r/s colon that needed to be cx due to work commitment       Depression Screening and Follow-up Plan: Patient was screened for depression during today's encounter. They screened negative with a PHQ-2 score of 0.      Rto 6months   Subjective      HPI  Pt ok but has some stress at home He is aware weight has not changed much but he did stop soda recently and plans to work on diet to lose weight He may be making some life changes soon which could help NO chest pain or sob He had colonoscopy scheduled but had to cancel due to work but will reschedule   Review of Systems   Constitutional:  Negative for chills and fever.   HENT: Negative.     Eyes:  Negative for visual disturbance.   Respiratory:  Negative for cough and shortness of breath.    Cardiovascular:  Negative for chest pain, palpitations and leg swelling.   Gastrointestinal:  Negative for abdominal distention and abdominal pain.   Genitourinary: Negative.    Musculoskeletal: Negative.    Neurological:  Negative for dizziness, light-headedness and headaches.   Psychiatric/Behavioral:  The patient is not nervous/anxious.        No current outpatient medications on file prior to visit.       Objective     /74   Pulse 88   Temp (!) 97.4 °F (36.3 °C) (Temporal)   Resp 18   Ht 6' 3\" (1.905 m)   Wt (!) 168 kg (371 lb)   SpO2 97%   BMI 46.37 kg/m²     Physical Exam  Vitals and nursing note reviewed.   Constitutional:       General: He is not in acute distress.     Appearance: Normal appearance. He is " not ill-appearing, toxic-appearing or diaphoretic.   HENT:      Head: Normocephalic and atraumatic.      Right Ear: External ear normal.      Left Ear: External ear normal.      Nose: Nose normal.      Mouth/Throat:      Mouth: Mucous membranes are moist.   Cardiovascular:      Rate and Rhythm: Normal rate and regular rhythm.      Pulses: Normal pulses.   Pulmonary:      Effort: Pulmonary effort is normal. No respiratory distress.      Breath sounds: Normal breath sounds. No wheezing.   Abdominal:      General: Bowel sounds are normal.      Palpations: Abdomen is soft.   Musculoskeletal:      Cervical back: Normal range of motion and neck supple.      Right lower leg: No edema.      Left lower leg: No edema.   Lymphadenopathy:      Cervical: No cervical adenopathy.   Skin:     General: Skin is warm and dry.      Coloration: Skin is not jaundiced or pale.   Neurological:      General: No focal deficit present.      Mental Status: He is alert and oriented to person, place, and time. Mental status is at baseline.      Cranial Nerves: No cranial nerve deficit.   Psychiatric:         Mood and Affect: Mood normal.         Behavior: Behavior normal.         Thought Content: Thought content normal.         Judgment: Judgment normal.       Estrellita Bryant,

## 2024-05-31 PROBLEM — Z12.11 COLON CANCER SCREENING: Status: RESOLVED | Noted: 2019-12-23 | Resolved: 2024-05-31

## 2024-06-10 ENCOUNTER — TELEPHONE (OUTPATIENT)
Dept: FAMILY MEDICINE CLINIC | Facility: CLINIC | Age: 59
End: 2024-06-10

## 2024-06-10 DIAGNOSIS — Z91.89 AT RISK FOR CARDIAC COMPLICATION: ICD-10-CM

## 2024-06-10 DIAGNOSIS — R73.03 PREDIABETES: Primary | ICD-10-CM

## 2024-06-10 NOTE — TELEPHONE ENCOUNTER
Patient requesting to get cardiac work up, due to extreme sob and family history (father) having cardiac issues.  He knows he gained a large amount of weight and needs to start walking, but unable to walk down the street without sob

## 2024-07-11 ENCOUNTER — CONSULT (OUTPATIENT)
Dept: CARDIOLOGY CLINIC | Facility: CLINIC | Age: 59
End: 2024-07-11
Payer: COMMERCIAL

## 2024-07-11 VITALS
SYSTOLIC BLOOD PRESSURE: 210 MMHG | WEIGHT: 315 LBS | HEIGHT: 75 IN | BODY MASS INDEX: 39.17 KG/M2 | OXYGEN SATURATION: 98 % | DIASTOLIC BLOOD PRESSURE: 120 MMHG | HEART RATE: 82 BPM

## 2024-07-11 DIAGNOSIS — R06.09 DOE (DYSPNEA ON EXERTION): Primary | ICD-10-CM

## 2024-07-11 DIAGNOSIS — I10 HYPERTENSION, UNSPECIFIED TYPE: ICD-10-CM

## 2024-07-11 DIAGNOSIS — E78.1 HYPERTRIGLYCERIDEMIA: ICD-10-CM

## 2024-07-11 DIAGNOSIS — R73.03 PREDIABETES: ICD-10-CM

## 2024-07-11 PROCEDURE — 99204 OFFICE O/P NEW MOD 45 MIN: CPT | Performed by: INTERNAL MEDICINE

## 2024-07-11 RX ORDER — LOSARTAN POTASSIUM 50 MG/1
50 TABLET ORAL DAILY
Qty: 30 TABLET | Refills: 0 | Status: SHIPPED | OUTPATIENT
Start: 2024-07-11 | End: 2024-07-18 | Stop reason: ALTCHOICE

## 2024-07-11 NOTE — PROGRESS NOTES
[facility] [place]  [address]  [address]  [facility] [address] [phone]      Patient name: Tyson Tovar   YOB: 1965   MR no: [id number]      Diagnosis ICD-10-CM Associated Orders   1. CRAWFORD (dyspnea on exertion)  R06.09 Echo complete w/ contrast if indicated      2. Hypertension, unspecified type  I10 Basic metabolic panel     Echo complete w/ contrast if indicated     losartan (COZAAR) 50 mg tablet      3. Prediabetes  R73.03       4. Hypertriglyceridemia  E78.1         ASSESSMENT AND RECOMMENDATIONS:  1. CRAWFORD (dyspnea on exertion)  -     Echo complete w/ contrast if indicated; Future; Expected date: 07/11/2024  2. Hypertension, unspecified type  -     Basic metabolic panel; Future  -     Echo complete w/ contrast if indicated; Future; Expected date: 07/11/2024  -     losartan (COZAAR) 50 mg tablet; Take 1 tablet (50 mg total) by mouth daily  3. Prediabetes  4. Hypertriglyceridemia     Blood pressure was double checked by me and it was 180/115.  Patient is completely asymptomatic.  Ideally he should go to the emergency room and get treatment for his severe hypertension, however he would prefer to start an oral medication.  I would recommend starting losartan 50 mg daily and to take 2 tablets today.  I have also advised him to keep a regular home blood pressure diary every day and call the office with any concerns or questions.  I also advised him to report to the emergency room immediately if he develops any headache or other symptoms.  Check BMP today.  I am also scheduling him for an echocardiogram for LV function and LVH assessment.  He would prefer to have it done at [facility] as that is only 5 minutes from his home.  We had a long discussion regarding aggressive lifestyle modification measures including regular exercise and significant changes in his dietary intake in order to lose weight and improve his prediabetes  as well as hypertriglyceridemia.  He will need stress testing as well once his blood pressure is adequately controlled.    CHIEF COMPLAINT:  Dyspnea on exertion    HPI:  58-year-old male with past medical history significant for severe obstructive sleep apnea status post tonsillectomy and uvulectomy with complete relief, prediabetes presents for his first cardiology visit.  He tells me that his wife and a colleague suggested that he see cardiology.  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  He denies any chest pain, palpitations or syncope.  He is quite sure that he was never diagnosed with hypertension and his blood pressure when he goes to see his his family care physician is usually in the 130/80 range.  Patient tells me that he was very active during his younger years and played basketball football and professional wrestling as well.    Past Medical History:   Diagnosis Date    Hypertension 8/7/2012        History reviewed. No pertinent surgical history.     Social History     Tobacco Use    Smoking status: Never    Smokeless tobacco: Never   Vaping Use    Vaping status: Never Used   Substance Use Topics    Alcohol use: Not Currently    Drug use: Never       FH: father had HTN, had CABG at 63 yrs of age.     Allergies   Allergen Reactions    Penicillins Anaphylaxis         Current Outpatient Medications:     losartan (COZAAR) 50 mg tablet, Take 1 tablet (50 mg total) by mouth daily, Disp: 30 tablet, Rfl: 0     Lab Results   Component Value Date    CREATININE 1.31 (H) 10/15/2022    CREATININE 1.04 05/17/2021    K 4.2 10/15/2022    K 3.7 05/17/2021    SODIUM 138 10/15/2022    SODIUM 137 05/17/2021    LDLCALC 88 10/15/2022    LDLCALC 89 05/17/2021    TRIG 182 (H) 10/15/2022    TRIG 263 (H) 05/17/2021    HDL 22 (L) 10/15/2022    HDL 27 (L) 05/17/2021    CHOLESTEROL 146 10/15/2022    CHOLESTEROL 169 05/17/2021  "      I have personally reviewed the EKG from today which showed normal sinus rhythm with subtle lateral T wave abnormalities.    REVIEW OF SYSTEMS   Positive for: Dyspnea on exertion, fatigue  Negative for: All remaining as reviewed below and in HPI.    SYSTEM SYMPTOMS REVIEWED:  General--weight change, fever, night sweats  Respiratory--cough, wheezing, shortness of breath, sputum production  Cardiovascular--chest pain, syncope, dyspnea on exertion, edema, decline in exercise tolerance, claudication   Gastrointestinal--persistent vomiting, diarrhea, abdominal distention, blood in stool   Muscular or skeletal--joint pain or swelling   Neurologic--headaches, syncope, abnormal movement  Hematologic--history of easy bruising and bleeding   Endocrine--thyroid enlargement, heat or cold intolerance, polyuria   Psychiatric--anxiety, depression     Vitals:    07/11/24 0825   BP: (!) 210/120   Pulse: 82   SpO2: 98%   Weight: (!) 169 kg (373 lb)   Height: 6' 3\" (1.905 m)       Wt Readings from Last 3 Encounters:   07/11/24 (!) 169 kg (373 lb)   05/01/24 (!) 168 kg (371 lb)   10/31/23 (!) 164 kg (361 lb)        Body mass index is 46.62 kg/m².     General physical examination:    General appearance: Alert, no acute distress, appears stated age, severe obesity.  HEENT: Mucous membranes are moist.  No obvious abnormality noted.  Neck: Supple with no lymphadenopathy.  No JVD.  Carotid pulses are intact.  No carotid bruit.  Cardiovascular system: Regular rhythm with occasional ectopy.  Normal S1 and S2.  No murmurs.  No rubs or gallops. Extremities: Mild bilateral lower extremity edema.  Moderate varicose veins left leg.    Pulmonary: Respirations unlabored.  Good air entry bilaterally.  Clear to auscultation bilaterally.  Gastrointestinal: Abdomen is soft and nontender.  Bowel sounds are positive.  Musculoskeletal: Upper Extremities: Normal upper motor strength. Lower Extremity: Normal motor strength. Gait: Normal.   Skin: Skin is " "warm.  Mild hyperpigmentation from chronic venous stasis over both lower shins.  Neurological: Patient is alert and oriented with no gross motor deficits.  Psychiatric: Mood is normal.  Behavior is normal.        Thank you for allowing me to be a part of this patient's care. If you have further questions, please feel free to contact me.    Osorio Benitez MD, FACC, FASE    Portions of the record  have been created with voice recognition software.  Occasional grammatical mistakes or wrong word or \"sound alike\" substitutions may have occurred due to the inherent limitations of voice recognition software. Please reach out to me directly for any clarifications.     "

## 2024-07-14 ENCOUNTER — APPOINTMENT (OUTPATIENT)
Dept: LAB | Facility: HOSPITAL | Age: 59
End: 2024-07-14
Payer: COMMERCIAL

## 2024-07-14 DIAGNOSIS — I10 HYPERTENSION, UNSPECIFIED TYPE: ICD-10-CM

## 2024-07-14 LAB
ANION GAP SERPL CALCULATED.3IONS-SCNC: 11 MMOL/L (ref 4–13)
BUN SERPL-MCNC: 19 MG/DL (ref 5–25)
CALCIUM SERPL-MCNC: 9.3 MG/DL (ref 8.4–10.2)
CHLORIDE SERPL-SCNC: 103 MMOL/L (ref 96–108)
CO2 SERPL-SCNC: 24 MMOL/L (ref 21–32)
CREAT SERPL-MCNC: 1.26 MG/DL (ref 0.6–1.3)
GFR SERPL CREATININE-BSD FRML MDRD: 62 ML/MIN/1.73SQ M
GLUCOSE P FAST SERPL-MCNC: 120 MG/DL (ref 65–99)
POTASSIUM SERPL-SCNC: 4 MMOL/L (ref 3.5–5.3)
SODIUM SERPL-SCNC: 138 MMOL/L (ref 135–147)

## 2024-07-14 PROCEDURE — 80048 BASIC METABOLIC PNL TOTAL CA: CPT

## 2024-07-14 PROCEDURE — 36415 COLL VENOUS BLD VENIPUNCTURE: CPT

## 2024-07-18 ENCOUNTER — HOSPITAL ENCOUNTER (OUTPATIENT)
Dept: NON INVASIVE DIAGNOSTICS | Facility: HOSPITAL | Age: 59
Discharge: HOME | End: 2024-07-18
Attending: INTERNAL MEDICINE
Payer: COMMERCIAL

## 2024-07-18 ENCOUNTER — TELEPHONE (OUTPATIENT)
Dept: FAMILY MEDICINE CLINIC | Facility: CLINIC | Age: 59
End: 2024-07-18

## 2024-07-18 VITALS
HEIGHT: 75 IN | HEART RATE: 80 BPM | BODY MASS INDEX: 39.17 KG/M2 | SYSTOLIC BLOOD PRESSURE: 138 MMHG | WEIGHT: 315 LBS | DIASTOLIC BLOOD PRESSURE: 74 MMHG

## 2024-07-18 DIAGNOSIS — R06.09 DOE (DYSPNEA ON EXERTION): ICD-10-CM

## 2024-07-18 DIAGNOSIS — I10 HYPERTENSION, UNSPECIFIED TYPE: ICD-10-CM

## 2024-07-18 LAB
AORTIC ROOT: 3.1 CM
AORTIC VALVE MEAN VELOCITY: 8.1 M/S
APICAL FOUR CHAMBER EJECTION FRACTION: 65 %
AV AREA BY CONTINUOUS VTI: 4 CM2
AV AREA PEAK VELOCITY: 3 CM2
AV LVOT MEAN GRADIENT: 3 MMHG
AV LVOT PEAK GRADIENT: 4 MMHG
AV MEAN GRADIENT: 3 MMHG
AV PEAK GRADIENT: 5 MMHG
AV VALVE AREA: 3.99 CM2
AV VELOCITY RATIO: 0.86
DOP CALC AO PEAK VEL: 1.17 M/S
DOP CALC AO VTI: 20.91 CM
DOP CALC LVOT AREA: 3.46 CM2
DOP CALC LVOT CARDIAC INDEX: 2.22 L/MIN/M2
DOP CALC LVOT CARDIAC OUTPUT: 6.35 L/MIN
DOP CALC LVOT DIAMETER: 2.1 CM
DOP CALC LVOT PEAK VEL VTI: 24.1 CM
DOP CALC LVOT PEAK VEL: 1.01 M/S
DOP CALC LVOT STROKE INDEX: 29 ML/M2
DOP CALC LVOT STROKE VOLUME: 83.43
E WAVE DECELERATION TIME: 293 MS
E/A RATIO: 1.3
FRACTIONAL SHORTENING: 26 (ref 28–44)
INTERVENTRICULAR SEPTUM IN DIASTOLE (PARASTERNAL SHORT AXIS VIEW): 1.5 CM
INTERVENTRICULAR SEPTUM: 1.5 CM (ref 0.6–1.1)
LAAS-AP2: 18 CM2
LAAS-AP4: 18.9 CM2
LEFT ATRIUM SIZE: 4.2 CM
LEFT ATRIUM VOLUME (MOD BIPLANE): 56 ML
LEFT ATRIUM VOLUME INDEX (MOD BIPLANE): 19.6 ML/M2
LEFT INTERNAL DIMENSION IN SYSTOLE: 3.7 CM (ref 2.1–4)
LEFT VENTRICULAR INTERNAL DIMENSION IN DIASTOLE: 5 CM (ref 3.5–6)
LEFT VENTRICULAR POSTERIOR WALL IN END DIASTOLE: 1.4 CM
LEFT VENTRICULAR STROKE VOLUME: 62 ML
LVSV (TEICH): 62 ML
MV E'TISSUE VEL-SEP: 6 CM/S
MV PEAK A VEL: 0.53 M/S
MV PEAK E VEL: 69 CM/S
MV STENOSIS PRESSURE HALF TIME: 85 MS
MV VALVE AREA P 1/2 METHOD: 2.59
RIGHT ATRIUM AREA SYSTOLE A4C: 17.2 CM2
RIGHT VENTRICLE ID DIMENSION: 3.3 CM
SL CV LEFT ATRIUM LENGTH A2C: 5.4 CM
SL CV LV EF: 55
SL CV PED ECHO LEFT VENTRICLE DIASTOLIC VOLUME (MOD BIPLANE) 2D: 121 ML
SL CV PED ECHO LEFT VENTRICLE SYSTOLIC VOLUME (MOD BIPLANE) 2D: 58 ML
TRICUSPID ANNULAR PLANE SYSTOLIC EXCURSION: 2.2 CM

## 2024-07-18 PROCEDURE — 93306 TTE W/DOPPLER COMPLETE: CPT

## 2024-07-18 PROCEDURE — 93306 TTE W/DOPPLER COMPLETE: CPT | Performed by: INTERNAL MEDICINE

## 2024-07-18 RX ORDER — AMLODIPINE BESYLATE 5 MG/1
5 TABLET ORAL DAILY
Qty: 909 TABLET | Refills: 3 | Status: SHIPPED | OUTPATIENT
Start: 2024-07-18

## 2024-07-18 RX ORDER — LOSARTAN POTASSIUM AND HYDROCHLOROTHIAZIDE 25; 100 MG/1; MG/1
1 TABLET ORAL DAILY
Qty: 90 TABLET | Refills: 3 | Status: SHIPPED | OUTPATIENT
Start: 2024-07-18

## 2024-07-18 RX ADMIN — PERFLUTREN 0.4 ML/MIN: 6.52 INJECTION, SUSPENSION INTRAVENOUS at 11:05

## 2024-07-18 NOTE — TELEPHONE ENCOUNTER
Please let the patient know that I have changed his 50 mg losartan to Hyzaar 100/25 mg 1 tablet daily in the morning and also started him on amlodipine 5 mg to be taken with every day in the morning.  He should continue to keep daily blood pressure log and call the office after about 2 weeks with the numbers.

## 2024-07-18 NOTE — TELEPHONE ENCOUNTER
Bp readings;  7/11  192/108  7/14  186/98  7/16  192/100   7/17  180 110  He feels fine, wondering if he could double up on the losartan 50mg

## 2024-08-13 ENCOUNTER — OFFICE VISIT (OUTPATIENT)
Dept: CARDIOLOGY CLINIC | Facility: HOSPITAL | Age: 59
End: 2024-08-13
Attending: INTERNAL MEDICINE
Payer: COMMERCIAL

## 2024-08-13 VITALS
BODY MASS INDEX: 39.17 KG/M2 | SYSTOLIC BLOOD PRESSURE: 198 MMHG | HEART RATE: 58 BPM | WEIGHT: 315 LBS | HEIGHT: 75 IN | OXYGEN SATURATION: 97 % | DIASTOLIC BLOOD PRESSURE: 80 MMHG

## 2024-08-13 DIAGNOSIS — R73.03 PREDIABETES: ICD-10-CM

## 2024-08-13 DIAGNOSIS — E66.01 MORBID OBESITY (HCC): ICD-10-CM

## 2024-08-13 DIAGNOSIS — I10 BENIGN ESSENTIAL HYPERTENSION: Primary | ICD-10-CM

## 2024-08-13 DIAGNOSIS — Z12.11 ENCOUNTER FOR SCREENING COLONOSCOPY: ICD-10-CM

## 2024-08-13 PROCEDURE — 99214 OFFICE O/P EST MOD 30 MIN: CPT | Performed by: INTERNAL MEDICINE

## 2024-08-13 RX ORDER — AMLODIPINE BESYLATE 10 MG/1
10 TABLET ORAL DAILY
Qty: 90 TABLET | Refills: 3
Start: 2024-08-13

## 2024-08-13 NOTE — PROGRESS NOTES
Cardiology Follow-up    Tyson Tovar  [id number]  1965  [facility] [address] [address]      1. Benign essential hypertension  amLODIPine (NORVASC) 10 mg tablet      2. Prediabetes  Ambulatory Referral to Cardiology      3. Morbid obesity (HCC)        4. Encounter for screening colonoscopy  Ambulatory referral for colon cancer education          Discussion/Summary: Mr. Tovar is a very pleasant 59-year-old gentleman who presents to the office today for re-evaluation of his blood pressure.    Overall his blood pressure control has improved but remains suboptimal.  I will increase his amlodipine to 10 mg daily.  He will remain on losartan/hydrochlorothiazide as prescribed.  He has a known history of SMITHA having underwent UPPP surgery many years ago.  We discussed undergoing a repeat sleep study for re-evaluation.  At this point he declines.  He had an echocardiogram done in the recent past revealing moderate concentric hypertrophy in the setting of his uncontrolled hypertension.  We discussed purchasing a home monitoring device for ambulatory blood pressure monitoring.  He will do so.  I have asked him to bring this with him to his next visit to ensure it is providing accurate readings.  We also discussed therapeutic lifestyle modifications including weight loss and implementing regular exercise in an attempt to lose weight.  He plans on implementing an exercise regimen in the near future.  A low-salt diet was reinforced.     Otherwise given his ongoing shortness of breath we discussed a stress test.  He would like to wait till after his next visit to [facility].    He had a full lipid panel in 2022 revealing an elevated triglyceride level and low HDL.  Based on his elevated 10-year risk statin therapy is indicated which I will discuss at his next visit.    I will see him  back in the office in a few months for reevaluation of his blood pressure.    History of Present Illness: Mr. Tovar is a very pleasant 59-year-old gentleman who presents to the office today for follow-up.  He was seen by one of my partners last month for the concern over shortness of breath with exertion.  He had noted shortness of breath with exertion for several months.      He was never diagnosed with hypertension and his blood pressure usually was well-controlled.  When he was seen in the office his blood pressure was significantly elevated.  He was placed on losartan.  This was eventually escalated to losartan-HCTZ along with amlodipine.  He underwent an echocardiogram revealing moderately increased LV wall thickness and no significant valvular heart disease.  It was recommended that he undergo a stress test once his blood pressure was better controlled.    He has a known history of obstructive sleep apnea and underwent UPPP with complete relief a number many years ago.  He has been told he snores loudly at night.  He reports daytime fatigue.      He does not participate in much formal physical activity.  He continues with exertional shortness of breath with activity such as carrying objects up steps.  He denies any exertional chest pain.  He denies any signs or symptoms of congestive heart failure including lower extremity edema, paroxysmal nocturnal dyspnea, orthopnea, acute weight gain or increasing abdominal girth.  He denies lightheadedness, syncope or presyncope.  He denies palpitations or symptoms of claudication.    He reports he attempts to abide by a low-salt diet.    His mother is alive at the age of 90 with valvular heart disease.  His father had open heart surgery at the age of 63 and  early 80s as a result of black lung.    He is a lifelong non-smoker.    Patient Active Problem List   Diagnosis    Benign essential hypertension    Morbid obesity (HCC)    Prediabetes    CRAWFORD (dyspnea on  exertion)    Hypertriglyceridemia     Past Medical History:   Diagnosis Date    Hypertension 8/7/2012     Social History     Socioeconomic History    Marital status: /Civil Union     Spouse name: Not on file    Number of children: Not on file    Years of education: Not on file    Highest education level: Not on file   Occupational History    Not on file   Tobacco Use    Smoking status: Never    Smokeless tobacco: Never   Vaping Use    Vaping status: Never Used   Substance and Sexual Activity    Alcohol use: Not Currently    Drug use: Never    Sexual activity: Not on file   Other Topics Concern    Not on file   Social History Narrative    Not on file     Social Determinants of Health     Financial Resource Strain: Not on file   Food Insecurity: Not on file   Transportation Needs: Not on file   Physical Activity: Not on file   Stress: Not on file   Social Connections: Not on file   Intimate Partner Violence: Not on file   Housing Stability: Not on file      Family History   Problem Relation Age of Onset    Heart disease Father      History reviewed. No pertinent surgical history.    Current Outpatient Medications:     amLODIPine (NORVASC) 10 mg tablet, Take 1 tablet (10 mg total) by mouth daily, Disp: 90 tablet, Rfl: 3    losartan-hydrochlorothiazide (HYZAAR) 100-25 MG per tablet, Take 1 tablet by mouth daily, Disp: 90 tablet, Rfl: 3  Allergies   Allergen Reactions    Penicillins Anaphylaxis       Imaging: Echo complete w/ contrast if indicated    Result Date: 7/18/2024  Narrative:   Left Ventricle: Left ventricular cavity size is normal. Wall thickness is moderately increased. The left ventricular ejection fraction is 55%. Systolic function is normal.   Left Atrium: The atrium is mildly dilated.   Mitral Valve: There is mild regurgitation.         Review of Systems:  Review of Systems   Constitutional:  Positive for fatigue.   Respiratory:  Positive for shortness of breath. Negative for apnea, wheezing and  "stridor.    Cardiovascular:  Negative for chest pain, palpitations and leg swelling.   All other systems reviewed and are negative.        Vitals:    08/13/24 1322   BP: (!) 198/80   Pulse: 58   SpO2: 97%   Weight: (!) 169 kg (372 lb 3.2 oz)   Height: 6' 3\" (1.905 m)     Vitals:    08/13/24 1322   Weight: (!) 169 kg (372 lb 3.2 oz)     Height: 6' 3\" (190.5 cm)     Physical Exam:  General appearance:  Appears stated age, alert, well appearing and in no distress  HEENT:  PERRLA, EOMI, no scleral icterus, no conjunctival pallor  NECK:  Supple, No elevated JVP, no thyromegaly, no carotid bruits  HEART:  Regular rate and rhythm, normal S1/S2, no S3/S4, no murmur or rub  LUNGS:  Clear to auscultation bilaterally  ABDOMEN:  Soft, non-tender, positive bowel sounds, no rebound or guarding, no organomegaly   EXTREMITIES:  No edema  VASCULAR:  Normal pedal pulses   SKIN: No lesions or rashes on exposed skin  NEURO:  CN II-XII intact, no focal deficits  "

## 2024-11-05 ENCOUNTER — OFFICE VISIT (OUTPATIENT)
Dept: FAMILY MEDICINE CLINIC | Facility: CLINIC | Age: 59
End: 2024-11-05
Payer: COMMERCIAL

## 2024-11-05 VITALS
DIASTOLIC BLOOD PRESSURE: 78 MMHG | WEIGHT: 315 LBS | HEART RATE: 77 BPM | RESPIRATION RATE: 18 BRPM | HEIGHT: 75 IN | SYSTOLIC BLOOD PRESSURE: 140 MMHG | OXYGEN SATURATION: 97 % | BODY MASS INDEX: 39.17 KG/M2 | TEMPERATURE: 97.7 F

## 2024-11-05 DIAGNOSIS — Z00.00 ANNUAL PHYSICAL EXAM: Primary | ICD-10-CM

## 2024-11-05 DIAGNOSIS — Z12.5 SCREENING FOR PROSTATE CANCER: ICD-10-CM

## 2024-11-05 DIAGNOSIS — Z12.11 COLON CANCER SCREENING: ICD-10-CM

## 2024-11-05 PROCEDURE — 99396 PREV VISIT EST AGE 40-64: CPT | Performed by: INTERNAL MEDICINE

## 2024-11-05 NOTE — ASSESSMENT & PLAN NOTE
Pt will get Wellness labs thru work in December  Cardio followup in December  Considering wt mgmt but working on his own currently  Exercise   Cologard sent

## 2024-11-05 NOTE — PROGRESS NOTES
Adult Annual Physical  Name: Tyson Tovar      : 1965      MRN: [id number]  Encounter Provider: Estrellita Bryant DO  Encounter Date: 2024   Encounter department: [facility]    Assessment & Plan  Screening for prostate cancer    Orders:    PSA, Total Screen; Future  Pt will get this since not on wellness labs   Annual physical exam  Pt will get Wellness labs thru work in December  Cardio followup in December  Considering wt mgmt but working on his own currently  Exercise   Cologard sent       Colon cancer screening    Orders:    Cologuard    Immunizations and preventive care screenings were discussed with patient today. Appropriate education was printed on patient's after visit summary.        Counseling:  Exercise: the importance of regular exercise/physical activity was discussed. Recommend exercise 3-5 times per week for at least 30 minutes.       Depression Screening and Follow-up Plan: Patient was screened for depression during today's encounter. They screened negative with a PHQ-2 score of 0.        History of Present Illness   Pt doing better since following with cardio/on meds has lost over 15 pounds and continuing to work on that His Mom lives with his brother now so he is at her home Feels ok overall and improved     Adult Annual Physical:  Patient presents for annual physical.     Diet and Physical Activity:  - Diet/Nutrition: low carb diet.  - Exercise: walking.    Depression Screening:  - PHQ-2 Score: 0    General Health:  - Sleep: 4-6 hours of sleep on average.  - Hearing: normal hearing bilateral ears.  - Vision: no vision problems.  - Dental: regular dental visits.    /GYN Health:    - History of STDs: no     Health:  - History of STDs: no.     Advanced Care Planning:  - Has an advanced directive?: no    - Has a durable medical POA?: no    - ACP document given to patient?: no      Review of Systems   Constitutional:  Negative for chills and fever.   HENT: Negative.    "  Eyes:  Negative for visual disturbance.   Respiratory:  Negative for cough and shortness of breath.    Cardiovascular: Negative.    Gastrointestinal: Negative.    Genitourinary: Negative.    Musculoskeletal:  Positive for arthralgias.   Neurological:  Negative for dizziness, light-headedness and headaches.   Psychiatric/Behavioral:  Negative for sleep disturbance. The patient is not nervous/anxious.      Past Medical History:   Diagnosis Date    Hypertension 8/7/2012     History reviewed. No pertinent surgical history.  Social History     Socioeconomic History    Marital status: /Civil Union     Spouse name: Not on file    Number of children: Not on file    Years of education: Not on file    Highest education level: Not on file   Occupational History    Not on file   Tobacco Use    Smoking status: Never    Smokeless tobacco: Never   Vaping Use    Vaping status: Never Used   Substance and Sexual Activity    Alcohol use: Not Currently    Drug use: Never    Sexual activity: Not on file   Other Topics Concern    Not on file   Social History Narrative    Not on file     Social Determinants of Health     Financial Resource Strain: Not on file   Food Insecurity: Not on file   Transportation Needs: Not on file   Physical Activity: Not on file   Stress: Not on file   Social Connections: Not on file   Intimate Partner Violence: Not on file   Housing Stability: Not on file     Allergies   Allergen Reactions    Penicillins Anaphylaxis         Objective     /78   Pulse 77   Temp 97.7 °F (36.5 °C) (Temporal)   Resp 18   Ht 6' 3\" (1.905 m)   Wt (!) 163 kg (360 lb)   SpO2 97%   BMI 45.00 kg/m²     Physical Exam  Vitals and nursing note reviewed.   Constitutional:       General: He is not in acute distress.     Appearance: Normal appearance. He is not ill-appearing, toxic-appearing or diaphoretic.   HENT:      Head: Normocephalic and atraumatic.      Right Ear: External ear normal.      Left Ear: External ear " normal.      Nose: Nose normal.      Mouth/Throat:      Mouth: Mucous membranes are moist.   Eyes:      Extraocular Movements: Extraocular movements intact.      Conjunctiva/sclera: Conjunctivae normal.      Pupils: Pupils are equal, round, and reactive to light.   Cardiovascular:      Rate and Rhythm: Normal rate and regular rhythm.      Pulses: Normal pulses.      Heart sounds: Normal heart sounds.   Pulmonary:      Effort: Pulmonary effort is normal. No respiratory distress.      Breath sounds: Normal breath sounds. No wheezing.   Abdominal:      General: Bowel sounds are normal. There is no distension.      Palpations: Abdomen is soft.      Tenderness: There is no abdominal tenderness.   Musculoskeletal:      Cervical back: Normal range of motion and neck supple.      Right lower leg: No edema.      Left lower leg: No edema.   Lymphadenopathy:      Cervical: No cervical adenopathy.   Skin:     General: Skin is warm and dry.   Neurological:      General: No focal deficit present.      Mental Status: He is alert and oriented to person, place, and time. Mental status is at baseline.   Psychiatric:         Mood and Affect: Mood normal.         Behavior: Behavior normal.         Thought Content: Thought content normal.         Judgment: Judgment normal.

## 2024-12-16 ENCOUNTER — OFFICE VISIT (OUTPATIENT)
Dept: CARDIOLOGY CLINIC | Facility: HOSPITAL | Age: 59
End: 2024-12-16
Payer: COMMERCIAL

## 2024-12-16 VITALS
BODY MASS INDEX: 45 KG/M2 | DIASTOLIC BLOOD PRESSURE: 98 MMHG | HEIGHT: 75 IN | HEART RATE: 86 BPM | OXYGEN SATURATION: 98 % | SYSTOLIC BLOOD PRESSURE: 142 MMHG

## 2024-12-16 DIAGNOSIS — E78.1 HYPERTRIGLYCERIDEMIA: ICD-10-CM

## 2024-12-16 DIAGNOSIS — E66.01 MORBID OBESITY (HCC): ICD-10-CM

## 2024-12-16 DIAGNOSIS — I10 BENIGN ESSENTIAL HYPERTENSION: Primary | ICD-10-CM

## 2024-12-16 PROCEDURE — 99214 OFFICE O/P EST MOD 30 MIN: CPT | Performed by: INTERNAL MEDICINE

## 2024-12-16 NOTE — PROGRESS NOTES
Cardiology Follow-up    Tyson Tovar  [id number]  1965  [facility] [address] [address]      1. Benign essential hypertension  Basic metabolic panel      2. Hypertriglyceridemia  Lipid Panel With Direct LDL      3. Morbid obesity (HCC)              Discussion/Summary: Mr. Tovar is a very pleasant 59-year-old gentleman who presents to the office today for re-evaluation of his blood pressure.    His blood pressure did come down upon my recheck and overall has improved but remains slightly suboptimal even when he checks it at home.  He is unsure of his current antihypertensive medication regimen and he will notify the office.  If he continues to take only 5 mg of amlodipine he will increase this to 10 mg daily and decrease the losartan-hydrochlorothiazide back to one tablet daily.  We discussed intensification of his antihypertensive medication regimen but given the fact that has been losing weight and has been exercising he would like to continue therapeutic lifestyle modifications.  A low-salt diet was reinforced.    He had a full lipid panel in 2022 revealing an elevated triglyceride level and low HDL.  I have asked for an updated lipid panel for reassessment.    Given the fact that his shortness of breath with exertion has resolved with weight loss no further testing is advised.    I will see him back in the office in six months or sooner if deemed necessary.    History of Present Illness: Mr. Tovar is a very pleasant 59-year-old gentleman who presents to the office today for follow-up.      During his last visit with me I increased his amlodipine to 10 mg daily.  He thinks he has been taking double the losartan-hydrochlorothiazide dose and only 5 mg of amlodipine daily.    Since his last visit he lost weight.  He has been exercising five days a week.  He does  mostly weight lifting but has been walking outdoors on a regular basis.  The shortness of breath he had been experiencing with ascending a flight of steps has resolved.  He denies any exertional chest pain.  He denies any signs or symptoms of congestive heart failure including lower extremity edema, paroxysmal nocturnal dyspnea, orthopnea, acute weight gain or increasing abdominal girth.  He denies lightheadedness, syncope or presyncope.  He denies palpitations or symptoms of claudication.    Patient Active Problem List   Diagnosis    Benign essential hypertension    Morbid obesity (HCC)    Annual physical exam    Prediabetes    [place] (dyspnea on exertion)    Hypertriglyceridemia     Past Medical History:   Diagnosis Date    Hypertension 8/7/2012     Social History     Socioeconomic History    Marital status: /Civil Union     Spouse name: Not on file    Number of children: Not on file    Years of education: Not on file    Highest education level: Not on file   Occupational History    Not on file   Tobacco Use    Smoking status: Never    Smokeless tobacco: Never   Vaping Use    Vaping status: Never Used   Substance and Sexual Activity    Alcohol use: Not Currently    Drug use: Never    Sexual activity: Not on file   Other Topics Concern    Not on file   Social History Narrative    Not on file     Social Drivers of Health     Financial Resource Strain: Not on file   Food Insecurity: Not on file   Transportation Needs: Not on file   Physical Activity: Not on file   Stress: Not on file   Social Connections: Not on file   Intimate Partner Violence: Not on file   Housing Stability: Not on file      Family History   Problem Relation Age of Onset    Heart disease Father      History reviewed. No pertinent surgical history.    Current Outpatient Medications:     amLODIPine (NORVASC) 10 mg tablet, Take 1 tablet (10 mg total) by mouth daily, Disp: 90 tablet, Rfl: 3    losartan-hydrochlorothiazide (HYZAAR) 100-25 MG per  "tablet, Take 1 tablet by mouth daily, Disp: 90 tablet, Rfl: 3  Allergies   Allergen Reactions    Penicillins Anaphylaxis       Imaging: Echo complete w/ contrast if indicated    Result Date: 7/18/2024  Narrative:   Left Ventricle: Left ventricular cavity size is normal. Wall thickness is moderately increased. The left ventricular ejection fraction is 55%. Systolic function is normal.   Left Atrium: The atrium is mildly dilated.   Mitral Valve: There is mild regurgitation.         Review of Systems:  Review of Systems   Respiratory:  Negative for apnea, cough, choking and stridor.    Cardiovascular:  Negative for chest pain, palpitations and leg swelling.   Musculoskeletal:  Positive for arthralgias.   All other systems reviewed and are negative.        Vitals:    12/16/24 1238 12/16/24 1304   BP: (!) 162/104 142/98   BP Location: Left arm    Patient Position: Sitting    Cuff Size: Large    Pulse: 86    SpO2: 98%    Height: 6' 3\" (1.905 m)        Vitals:       Height: 6' 3\" (190.5 cm)     Physical Exam:  General appearance:  Appears stated age, alert, well appearing and in no distress  HEENT:  PERRLA, EOMI, no scleral icterus, no conjunctival pallor  NECK:  Supple, No elevated JVP, no thyromegaly, no carotid bruits  HEART:  Regular rate and rhythm, normal S1/S2, no S3/S4, no murmur or rub  LUNGS:  Clear to auscultation bilaterally  ABDOMEN:  Soft, non-tender, positive bowel sounds, no rebound or guarding, no organomegaly   EXTREMITIES:  No edema  VASCULAR:  Normal pedal pulses   SKIN: No lesions or rashes on exposed skin  NEURO:  CN II-XII intact, no focal deficits  "

## 2024-12-26 ENCOUNTER — APPOINTMENT (OUTPATIENT)
Dept: RADIOLOGY | Facility: MEDICAL CENTER | Age: 59
End: 2024-12-26
Payer: COMMERCIAL

## 2024-12-26 VITALS — HEIGHT: 75 IN | WEIGHT: 315 LBS | BODY MASS INDEX: 39.17 KG/M2

## 2024-12-26 DIAGNOSIS — S80.02XA CONTUSION OF LEFT KNEE, INITIAL ENCOUNTER: ICD-10-CM

## 2024-12-26 DIAGNOSIS — M25.562 LEFT KNEE PAIN, UNSPECIFIED CHRONICITY: Primary | ICD-10-CM

## 2024-12-26 DIAGNOSIS — M25.562 LEFT KNEE PAIN, UNSPECIFIED CHRONICITY: ICD-10-CM

## 2024-12-26 PROCEDURE — 73562 X-RAY EXAM OF KNEE 3: CPT

## 2024-12-26 PROCEDURE — 99203 OFFICE O/P NEW LOW 30 MIN: CPT | Performed by: ORTHOPAEDIC SURGERY

## 2024-12-26 NOTE — PROGRESS NOTES
Patient Name:  Tyson Tovar  MRN:  [id number]    Assessment & Plan     1. Left knee pain, unspecified chronicity  -     XR knee 3 vw left non injury; Future; Expected date: 12/26/2024  2. Contusion of left knee, initial encounter      Left knee contusion    The patient's x-rays were reviewed today. The patient's x-rays are normal. The patient is experiencing symptoms consistent with left knee contusion. The patient has no restrictions and may continue with his activities of daily life. I will see the patient back as needed.     The patient sustained a contusion of his left knee when he struck a safe at work.  He has been working since.  He offers no complaints of pain.  He denies any numbness or ting.  He denies mechanical issues.  X-rays were negative.  Continue home exercise program.  Follow-up on an as-needed basis.  If his condition changes, he would not hesitate to let us know.  He may return back to work any full and unlimited capacity without any restrictions      Chief Complaint     Left knee pain    History of the Present Illness     Tyson Tovar is a 59 y.o. male with Left knee pain status post work related injury on 11/20/2024. The patient works for the [employer]. The patient hit the inside of his left knee on a safe at work. He had swelling and pain in the medial aspect of his knee. He notes improvement in symptoms with occasional stiffness.    Review of Systems     Review of Systems   Constitutional:  Negative for appetite change and unexpected weight change.   HENT:  Negative for congestion and trouble swallowing.    Eyes:  Negative for visual disturbance.   Respiratory:  Negative for cough and shortness of breath.    Cardiovascular:  Negative for chest pain and palpitations.   Gastrointestinal:  Negative for nausea and vomiting.   Endocrine: Negative for cold intolerance and heat intolerance.   Musculoskeletal:  Positive for arthralgias (left knee). Negative for joint swelling  "and myalgias.   Skin:  Negative for rash.   Neurological:  Negative for numbness.       Physical Exam  Vitals and nursing note reviewed.   Constitutional:       General: He is not in acute distress.     Appearance: He is well-developed.   HENT:      Head: Normocephalic and atraumatic.   Eyes:      Conjunctiva/sclera: Conjunctivae normal.   Cardiovascular:      Rate and Rhythm: Normal rate and regular rhythm.      Heart sounds: No murmur heard.  Pulmonary:      Effort: Pulmonary effort is normal. No respiratory distress.      Breath sounds: Normal breath sounds.   Abdominal:      Palpations: Abdomen is soft.      Tenderness: There is no abdominal tenderness.   Musculoskeletal:         General: No swelling.      Cervical back: Neck supple.   Skin:     General: Skin is warm and dry.      Capillary Refill: Capillary refill takes less than 2 seconds.   Neurological:      Mental Status: He is alert.   Psychiatric:         Mood and Affect: Mood normal.           Physical Exam     Ht 6' 3\" (1.905 m)   Wt (!) 163 kg (360 lb)   BMI 45.00 kg/m²     Left  Knee  Range of motion from 0 to 120.    There is no crepitus with range of motion.   There is no effusion.    There is no tenderness.    The patient is able to perform a straight leg raise.    Varus stress testing reveals no instability at 0 and 30 degrees   Valgus stress testing reveals no instability at 0 and 30 degrees  The patient is neurovascular intact distally.        Data Review     I have personally reviewed pertinent films in PACS, and my interpretation follows:    X-rays taken 12/26/2024 of Left knee independently reviewed and demonstrate well preserved joint spaces. Mild lateral patella tilt. No acute osseous abnormality or fracture.     Past Medical History:   Diagnosis Date    Hypertension 8/7/2012       History reviewed. No pertinent surgical history.    Allergies   Allergen Reactions    Penicillins Anaphylaxis       Current Outpatient Medications on File " Prior to Visit   Medication Sig Dispense Refill    amLODIPine (NORVASC) 10 mg tablet Take 1 tablet (10 mg total) by mouth daily 90 tablet 3    losartan-hydrochlorothiazide (HYZAAR) 100-25 MG per tablet Take 1 tablet by mouth daily 90 tablet 3     No current facility-administered medications on file prior to visit.       Social History     Tobacco Use    Smoking status: Never    Smokeless tobacco: Never   Vaping Use    Vaping status: Never Used   Substance Use Topics    Alcohol use: Not Currently    Drug use: Never       Family History   Problem Relation Age of Onset    Heart disease Father                Marie Chase  Scribe Attestation      I,:  Marie Chase am acting as a scribe while in the presence of the attending physician.:       I,:  Vaughn Tovar, DO personally performed the services described in this documentation    as scribed in my presence.:

## 2024-12-26 NOTE — LETTER
December 26, 2024     Patient: Tyson Tovar  YOB: 1965  Date of Visit: 12/26/2024      To Whom it May Concern:    Tyson Tovar is under my professional care. Tyson was seen in my office on 12/26/2024. Tyson may continue working without restrictions.     If you have any questions or concerns, please don't hesitate to call.         Sincerely,          Vaughn Tovar,         CC: No Recipients

## 2025-05-06 ENCOUNTER — OFFICE VISIT (OUTPATIENT)
Dept: FAMILY MEDICINE CLINIC | Facility: CLINIC | Age: 60
End: 2025-05-06
Payer: COMMERCIAL

## 2025-05-06 VITALS
OXYGEN SATURATION: 96 % | RESPIRATION RATE: 18 BRPM | HEART RATE: 72 BPM | BODY MASS INDEX: 39.17 KG/M2 | SYSTOLIC BLOOD PRESSURE: 148 MMHG | TEMPERATURE: 97.7 F | DIASTOLIC BLOOD PRESSURE: 78 MMHG | HEIGHT: 75 IN | WEIGHT: 315 LBS

## 2025-05-06 DIAGNOSIS — R73.03 PREDIABETES: ICD-10-CM

## 2025-05-06 DIAGNOSIS — I10 BENIGN ESSENTIAL HYPERTENSION: ICD-10-CM

## 2025-05-06 DIAGNOSIS — E66.01 MORBID OBESITY (HCC): Primary | ICD-10-CM

## 2025-05-06 PROCEDURE — 99214 OFFICE O/P EST MOD 30 MIN: CPT | Performed by: INTERNAL MEDICINE

## 2025-05-06 NOTE — PROGRESS NOTES
"Name: Tyson Tovar      : 1965      MRN: [id number]  Encounter Provider: Estrellita Bryant DO  Encounter Date: 2025   Encounter department: [facility]  :  Assessment & Plan  Morbid obesity (HCC)  Pt is continuing to lose weight and will increase walking/exercise   He has continued motivation to lose further pounds and overall feels better          Benign essential hypertension  BP mildly elevated off medication so encouraged pt to resume losartan and monitor BP at home Since he plans continued weight loss may need to decrease dose if BP lowers He does have cardio appt in         Prediabetes    Orders:    Hemoglobin A1C; Future  Recheck labs in Fall   Rto 6months   Colonoscopy encouraged - pt states he has cologard and will complete      History of Present Illness   HPI  Pt doing well overall He has lost significant weight since last visit with diet changes and is more active He has ongoing left knee pain following trauma in December but unsure he wants further intervention No chest pain or sob He stopped Bp meds 4 months ago No dizziness or headaches No change in bowels His Mom is now in NH long term and he visits often   Review of Systems   Constitutional:  Negative for chills and fever.   HENT: Negative.     Eyes:  Negative for visual disturbance.   Respiratory:  Negative for cough and shortness of breath.    Cardiovascular: Negative.    Gastrointestinal: Negative.    Genitourinary: Negative.    Musculoskeletal:  Positive for arthralgias and gait problem.   Neurological:  Negative for dizziness, light-headedness and headaches.   Psychiatric/Behavioral:  Negative for sleep disturbance. The patient is not nervous/anxious.        Objective   /78   Pulse 72   Temp 97.7 °F (36.5 °C) (Temporal)   Resp 18   Ht 6' 3\" (1.905 m)   Wt (!) 145 kg (320 lb)   SpO2 96%   BMI 40.00 kg/m²      Physical Exam  Vitals and nursing note reviewed.   Constitutional:       General: He is not in " acute distress.     Appearance: Normal appearance. He is not ill-appearing, toxic-appearing or diaphoretic.   HENT:      Head: Normocephalic and atraumatic.      Right Ear: External ear normal.      Left Ear: External ear normal.      Nose: Nose normal.      Mouth/Throat:      Mouth: Mucous membranes are moist.   Eyes:      General: No scleral icterus.     Extraocular Movements: Extraocular movements intact.      Conjunctiva/sclera: Conjunctivae normal.      Pupils: Pupils are equal, round, and reactive to light.   Cardiovascular:      Rate and Rhythm: Normal rate and regular rhythm.      Pulses: Normal pulses.      Heart sounds: Normal heart sounds.   Pulmonary:      Effort: Pulmonary effort is normal.      Breath sounds: Normal breath sounds.   Abdominal:      General: Bowel sounds are normal. There is no distension.      Palpations: Abdomen is soft.      Tenderness: There is no abdominal tenderness.   Musculoskeletal:         General: Normal range of motion.      Cervical back: Normal range of motion and neck supple.      Right lower leg: No edema.      Left lower leg: No edema.   Lymphadenopathy:      Cervical: No cervical adenopathy.   Skin:     General: Skin is warm and dry.   Neurological:      General: No focal deficit present.      Mental Status: He is alert and oriented to person, place, and time. Mental status is at baseline.   Psychiatric:         Mood and Affect: Mood normal.         Behavior: Behavior normal.         Thought Content: Thought content normal.         Judgment: Judgment normal.

## 2025-05-06 NOTE — ASSESSMENT & PLAN NOTE
BP mildly elevated off medication so encouraged pt to resume losartan and monitor BP at home Since he plans continued weight loss may need to decrease dose if BP lowers He does have cardio appt in fall

## 2025-05-06 NOTE — ASSESSMENT & PLAN NOTE
Pt is continuing to lose weight and will increase walking/exercise   He has continued motivation to lose further pounds and overall feels better